# Patient Record
Sex: FEMALE | Race: WHITE | Employment: UNEMPLOYED | URBAN - METROPOLITAN AREA
[De-identification: names, ages, dates, MRNs, and addresses within clinical notes are randomized per-mention and may not be internally consistent; named-entity substitution may affect disease eponyms.]

---

## 2017-02-24 ENCOUNTER — OFFICE VISIT (OUTPATIENT)
Dept: INTERNAL MEDICINE CLINIC | Age: 62
End: 2017-02-24

## 2017-02-24 VITALS
RESPIRATION RATE: 16 BRPM | DIASTOLIC BLOOD PRESSURE: 63 MMHG | BODY MASS INDEX: 20.16 KG/M2 | SYSTOLIC BLOOD PRESSURE: 116 MMHG | HEIGHT: 63 IN | WEIGHT: 113.8 LBS | HEART RATE: 79 BPM | TEMPERATURE: 97.9 F | OXYGEN SATURATION: 98 %

## 2017-02-24 DIAGNOSIS — M81.0 OSTEOPOROSIS: ICD-10-CM

## 2017-02-24 DIAGNOSIS — Z00.00 ROUTINE GENERAL MEDICAL EXAMINATION AT A HEALTH CARE FACILITY: ICD-10-CM

## 2017-02-24 DIAGNOSIS — Z23 ENCOUNTER FOR IMMUNIZATION: ICD-10-CM

## 2017-02-24 DIAGNOSIS — K21.9 GASTROESOPHAGEAL REFLUX DISEASE, ESOPHAGITIS PRESENCE NOT SPECIFIED: Primary | ICD-10-CM

## 2017-02-24 DIAGNOSIS — G24.3 CERVICAL DYSTONIA: ICD-10-CM

## 2017-02-24 RX ORDER — HYDROGEN PEROXIDE 3 %
SOLUTION, NON-ORAL MISCELLANEOUS DAILY
COMMUNITY
End: 2018-12-21 | Stop reason: ALTCHOICE

## 2017-02-24 NOTE — PROGRESS NOTES
HISTORY OF PRESENT ILLNESS  Pratima Ferguson is a 62 y.o. female. HPI  Continues with dystonia of neck. Last Botox was 12/28 through Dr. Claudia King at [facility]. Improved. Took ballRevenew class with [employer]. Small growth left nasolabial fold started approx 1 year ago. Grew initially but has been stable since. No bleeding or crusting. Pulling down leotard torn tendon distal left finger. Cannot straighten distal left 5th finger. Seeing Dr. Pineda Ruiz at [facility]. Saw Dr. Natasha Pritchard re osteoporosis. FRAX showed low risk, recommended not starting Prolia. Then had repeat scan in May 2016 - values had improved. Again said no Prolia at this time but recommended repeating scan in 2 years. Was taking 2 yoga classes and [place]. Started having neck and low back pain. Took 3 weeks off and restarted but just taking 1 yoga class daily. Has modified her yoga to avoid any flexion of spine. Area at umbilicus has stopped draining. Saw Dr. Luis Melo - felt benign fluid collection under umbilicus. Offered to remove but pt declined. Pap through Dr. Wendi Long. Current Outpatient Prescriptions:     esomeprazole (NEXIUM) 20 mg capsule, Take  by mouth daily. , Disp: , Rfl:     ranitidine hcl 150 mg capsule, TAKE ONE CAPSULE BY MOUTH AT BEDTIME, Disp: 30 Cap, Rfl: 11    baclofen (LIORESAL) 10 mg tablet, Take 5 mg by mouth two (2) times a day., Disp: , Rfl: 11    calcium citrate-vitamin d3 (CITRACAL + D) 315-200 mg-unit tab, Take 1 tablet by mouth daily (with breakfast). , Disp: , Rfl:     botulinum toxin type A (BOTOX) 100 unit injection, by IntraMUSCular route once., Disp: , Rfl:     Cholecalciferol, Vitamin D3, (VITAMIN D3) 1,000 unit cap, Take  by mouth., Disp: , Rfl:     lactobac cmb #3-fos-pantethine (PROBIOTIC & ACIDOPHILUS) 300-250 million cell-mg cap, Take  by mouth., Disp: , Rfl:     ACETAMINOPHEN (TYLENOL PO), Takes two po as needed.  , Disp: , Rfl:    pseudoephedrine (SUDAFED) 30 mg tablet, Take 60 mg by mouth as needed. , Disp: , Rfl:     gabapentin (NEURONTIN) 100 mg capsule, Take 100 mg by mouth three (3) times daily. , Disp: , Rfl:     Visit Vitals    /63 (BP 1 Location: Left arm, BP Patient Position: Sitting)    Pulse 79    Temp 97.9 °F (36.6 °C) (Oral)    Resp 16    Ht 5' 3\" (1.6 m)    Wt 113 lb 12.8 oz (51.6 kg)    SpO2 98%    BMI 20.16 kg/m2       ROS  See above  Physical Exam   Constitutional: She appears well-developed and well-nourished. HENT:   Head: Normocephalic and atraumatic. Neck: Neck supple. No thyromegaly present. Decreased dystonic movements of neck and lower facial muscles   Cardiovascular: Normal rate, regular rhythm and normal heart sounds. Exam reveals no gallop and no friction rub. No murmur heard. Pulmonary/Chest: Effort normal and breath sounds normal.   Abdominal: Soft. Bowel sounds are normal. She exhibits no distension and no mass. There is no tenderness. Musculoskeletal: She exhibits no edema. Lymphadenopathy:     She has no cervical adenopathy. Vitals reviewed. ASSESSMENT and PLAN  Dystonia - improved, cont same  GERD - controlled, tied into dystonia.  would like to wean down from Zantac - will take 75mg x 2 weeks then stop. Will resum if reflux returnse  Osteoporosis- improved on last scan. Currently on no medications. Will repeat scan in 2018. conitnue weight bearing exercise  HM - check labs  HM - rx for tdap and zostavax given.     Orders Placed This Encounter    METABOLIC PANEL, COMPREHENSIVE    LIPID PANEL    VITAMIN D, 25 HYDROXY    esomeprazole (NEXIUM) 20 mg capsule    varicella zoster vacine live (VARICELLA-ZOSTER VACINE LIVE) 19,400 unit/0.65 mL susr injection    diph,pertuss,acel,,tetanus vac,PF, (ADACEL) 2 Lf-(2.5-5-3-5 mcg)-5Lf/0.5 mL syrg vaccine     Follow-up Disposition: Not on File

## 2017-02-24 NOTE — MR AVS SNAPSHOT
Visit Information Date & Time Provider Department Dept. Phone Encounter #  
 2/24/2017 10:00 AM Shahnaz Rose MD [facility] [phone] [id number] Upcoming Health Maintenance Date Due DTaP/Tdap/Td series (1 - Tdap) 1/2/2006 PAP AKA CERVICAL CYTOLOGY 10/30/2016 ZOSTER VACCINE AGE 60> 11/16/2016 BREAST CANCER SCRN MAMMOGRAM 10/19/2017 Bone Densitometry 5/19/2018 COLONOSCOPY 12/17/2019 Allergies as of 2/24/2017  Review Complete On: 2/24/2017 By: Shahnaz Rose MD  
  
 Severity Noted Reaction Type Reactions Advair Diskus [Fluticasone-salmeterol]  09/23/2011    Other (comments) agitation Asa-acetaminophen-caff-potass  09/23/2011    Unknown (comments) Aspirin  10/03/2012    Nausea and Vomiting Codeine  09/23/2011    Nausea and Vomiting Compazine [Prochlorperazine]  12/22/2014    Unknown (comments) Flonase [Fluticasone]  09/23/2011    Swelling Swelling of lips and tongue. Ibuprofen  09/23/2011    Nausea and Vomiting Nsaids (Non-steroidal Anti-inflammatory Drug)  09/23/2011    Nausea and Vomiting Other Medication  10/03/2012    Other (comments) Cannot take neuroleptics, trazadone, phenergan or compazine due to dystonia. Cannot take blood thinners due to atrial septal defect. Sulfa (Sulfonamide Antibiotics)  09/23/2011    Other (comments) GI Distress Current Immunizations  Reviewed on 12/22/2014 Name Date Influenza Vaccine 10/11/2016, 10/6/2014 Influenza Vaccine (Quad) PF 9/30/2015 Td 1/1/2006 Not reviewed this visit You Were Diagnosed With   
  
 Codes Comments Gastroesophageal reflux disease, esophagitis presence not specified    -  Primary ICD-10-CM: K21.9 ICD-9-CM: 530.81 Encounter for immunization     ICD-10-CM: E98 ICD-9-CM: V03.89 Osteoporosis     ICD-10-CM: M81.0 ICD-9-CM: 733.00 Cervical dystonia     ICD-10-CM: G24.3 ICD-9-CM: 333.83   
 Routine general medical examination at a health care facility     ICD-10-CM: Z00.00 ICD-9-CM: V70.0 Vitals BP  
  
  
  
  
  
 116/63 (BP 1 Location: Left arm, BP Patient Position: Sitting) BMI and BSA Data Body Mass Index Body Surface Area  
 20.16 kg/m 2 1.51 m 2 Preferred Pharmacy Pharmacy Name Phone [id number], [address] [place] [address] [phone] Your Updated Medication List  
  
   
This list is accurate as of: 2/24/17 11:19 AM.  Always use your most recent med list.  
  
  
  
  
 baclofen 10 mg tablet Commonly known as:  LIORESAL Take 5 mg by mouth two (2) times a day. BOTOX 100 unit injection Generic drug:  onabotulinumtoxinA  
by IntraMUSCular route once. CITRACAL PLUS D 315-200 mg-unit Tab Generic drug:  calcium citrate-vitamin d3 Take 1 tablet by mouth daily (with breakfast). diph,pertuss(acel),tetanus vac(PF) 2 Lf-(2.5-5-3-5 mcg)-5Lf/0.5 mL Syrg vaccine Commonly known as:  ADACEL  
0.5 mL by IntraMUSCular route once for 1 dose. NEURONTIN 100 mg capsule Generic drug:  gabapentin Take 100 mg by mouth three (3) times daily. NexIUM 20 mg capsule Generic drug:  esomeprazole Take  by mouth daily. PROBIOTIC AND ACIDOPHILUS 300-250 million cell-mg Cap Generic drug:  lactobac cmb #3-fos-pantethine Take  by mouth. raNITIdine hcl 150 mg capsule TAKE ONE CAPSULE BY MOUTH AT BEDTIME  
  
 SUDAFED 30 mg tablet Generic drug:  pseudoephedrine Take 60 mg by mouth as needed. TYLENOL PO Takes two po as needed. varicella zoster vacine live 19,400 unit/0.65 mL Susr injection Commonly known as:  varicella-zoster vacine live 1 Vial by SubCUTAneous route once for 1 dose. VITAMIN D3 1,000 unit Cap Generic drug:  cholecalciferol Take  by mouth. Prescriptions Printed Refills varicella zoster vacine live (VARICELLA-ZOSTER VACINE LIVE) 19,400 unit/0.65 mL susr injection 0 Si Vial by SubCUTAneous route once for 1 dose. Class: Print Route: SubCUTAneous diph,pertuss,acel,,tetanus vac,PF, (ADACEL) 2 Lf-(2.5-5-3-5 mcg)-5Lf/0.5 mL syrg vaccine 0 Si.5 mL by IntraMUSCular route once for 1 dose. Class: Print Route: IntraMUSCular We Performed the Following LIPID PANEL [35798 CPT(R)] METABOLIC PANEL, COMPREHENSIVE [90817 CPT(R)] VITAMIN D, 25 HYDROXY [id number] CPT(R)] Patient Instructions [employer] Activation Thank you for requesting access to [employer]. Please follow the instructions below to securely access and download your online medical record. [employer] allows you to send messages to your doctor, view your test results, renew your prescriptions, schedule appointments, and more. How Do I Sign Up? 1. In your internet browser, go to [email or web address] 
2. Click on the First Time User? Click Here link in the Sign In box. You will be redirect to the New Member Sign Up page. 3. Enter your [employer] Access Code exactly as it appears below. You will not need to use this code after youve completed the sign-up process. If you do not sign up before the expiration date, you must request a new code. [employer] Access Code: [email or web address] Expires: 2017 10:08 AM (This is the date your [employer] access code will ) 4. Enter the last four digits of your Social Security Number (xxxx) and Date of Birth (mm/dd/yyyy) as indicated and click Submit. You will be taken to the next sign-up page. 5. Create a [employer] ID. This will be your [employer] login ID and cannot be changed, so think of one that is secure and easy to remember. 6. Create a [employer] password. You can change your password at any time. 7. Enter your Password Reset Question and Answer. This can be used at a later time if you forget your password. 8. Enter your e-mail address. You will receive e-mail notification when new information is available in [address] 9. Click Sign Up. You can now view and download portions of your medical record. 10. Click the Download Summary menu link to download a portable copy of your medical information. Additional Information If you have questions, please visit the Frequently Asked Questions section of the [employer] website at [email or web address] Remember, [employer] is NOT to be used for urgent needs. For medical emergencies, dial 911. Introducing [facility]! Lorenza Whitmore introduces [employer] patient portal. Now you can access parts of your medical record, email your doctor's office, and request medication refills online. 1. In your internet browser, go to [email or web address] 2. Click on the First Time User? Click Here link in the Sign In box. You will see the New Member Sign Up page. 3. Enter your [employer] Access Code exactly as it appears below. You will not need to use this code after youve completed the sign-up process. If you do not sign up before the expiration date, you must request a new code. · [employer] [facility] Access Code: [email or web address] Expires: 5/25/2017 10:08 AM 
 
4. Enter the last four digits of your Social Security Number (xxxx) and Date of Birth (mm/dd/yyyy) as indicated and click Submit. You will be taken to the next sign-up page. 5. Create a [facility] ID. This will be your [employer] [facility] login ID and cannot be changed, so think of one that is secure and easy to remember. 6. Create a [employer] [facility] password. You can change your password at any time. 7. Enter your Password Reset Question and Answer. This can be used at a later time if you forget your password. 8. Enter your e-mail address. You will receive e-mail notification when new information is available in [address] 9. Click Sign Up. You can now view and download portions of your medical record. 10. Click the Download Summary menu link to download a portable copy of your medical information. If you have questions, please visit the Frequently Asked Questions section of the [employer] website. Remember, [employer] is NOT to be used for urgent needs. For medical emergencies, dial 911. Now available from your iPhone and Android! Please provide this summary of care documentation to your next provider. Your primary care clinician is listed as CATHERINE ALONSO. If you have any questions after today's visit, please call [phone].

## 2017-02-24 NOTE — PATIENT INSTRUCTIONS
[employer] Activation    Thank you for requesting access to [employer]. Please follow the instructions below to securely access and download your online medical record. [employer] allows you to send messages to your doctor, view your test results, renew your prescriptions, schedule appointments, and more. How Do I Sign Up? 1. In your internet browser, go to [email or web address]  2. Click on the First Time User? Click Here link in the Sign In box. You will be redirect to the New Member Sign Up page. 3. Enter your [employer]EE Access Code exactly as it appears below. You will not need to use this code after youve completed the sign-up process. If you do not sign up before the expiration date, you must request a new code. AMEE Access Code: [email or web address]  Expires: 2017 10:08 AM (This is the date your AMEE access code will )    4. Enter the last four digits of your Social Security Number (xxxx) and Date of Birth (mm/dd/yyyy) as indicated and click Submit. You will be taken to the next sign-up page. 5. Create a AMEE ID. This will be your AMEE login ID and cannot be changed, so think of one that is secure and easy to remember. 6. Create a AMEE password. You can change your password at any time. 7. Enter your Password Reset Question and Answer. This can be used at a later time if you forget your password. 8. Enter your e-mail address. You will receive e-mail notification when new information is available in [address] 9. Click Sign Up. You can now view and download portions of your medical record. 10. Click the Download Summary menu link to download a portable copy of your medical information. Additional Information    If you have questions, please visit the Frequently Asked Questions section of the [employer] website at [email or web address] Remember, [employer] is NOT to be used for urgent needs. For medical emergencies, dial 911.

## 2017-02-25 LAB
25(OH)D3+25(OH)D2 SERPL-MCNC: 55.7 NG/ML (ref 30–100)
ALBUMIN SERPL-MCNC: 4.7 G/DL (ref 3.6–4.8)
ALBUMIN/GLOB SERPL: 1.9 {RATIO} (ref 1.1–2.5)
ALP SERPL-CCNC: 42 IU/L (ref 39–117)
ALT SERPL-CCNC: 10 IU/L (ref 0–32)
AST SERPL-CCNC: 18 IU/L (ref 0–40)
BILIRUB SERPL-MCNC: 0.6 MG/DL (ref 0–1.2)
BUN SERPL-MCNC: 18 MG/DL (ref 8–27)
BUN/CREAT SERPL: 23 (ref 11–26)
CALCIUM SERPL-MCNC: 9.8 MG/DL (ref 8.7–10.3)
CHLORIDE SERPL-SCNC: 101 MMOL/L (ref 96–106)
CHOLEST SERPL-MCNC: 217 MG/DL (ref 100–199)
CO2 SERPL-SCNC: 25 MMOL/L (ref 18–29)
CREAT SERPL-MCNC: 0.77 MG/DL (ref 0.57–1)
GLOBULIN SER CALC-MCNC: 2.5 G/DL (ref 1.5–4.5)
GLUCOSE SERPL-MCNC: 98 MG/DL (ref 65–99)
HDLC SERPL-MCNC: 77 MG/DL
INTERPRETATION, 910389: NORMAL
LDLC SERPL CALC-MCNC: 125 MG/DL (ref 0–99)
POTASSIUM SERPL-SCNC: 4.7 MMOL/L (ref 3.5–5.2)
PROT SERPL-MCNC: 7.2 G/DL (ref 6–8.5)
SODIUM SERPL-SCNC: 142 MMOL/L (ref 134–144)
TRIGL SERPL-MCNC: 76 MG/DL (ref 0–149)
VLDLC SERPL CALC-MCNC: 15 MG/DL (ref 5–40)

## 2018-12-14 ENCOUNTER — OFFICE VISIT (OUTPATIENT)
Dept: INTERNAL MEDICINE CLINIC | Age: 63
End: 2018-12-14

## 2018-12-14 VITALS
RESPIRATION RATE: 12 BRPM | OXYGEN SATURATION: 99 % | BODY MASS INDEX: 20.27 KG/M2 | SYSTOLIC BLOOD PRESSURE: 112 MMHG | TEMPERATURE: 98.2 F | DIASTOLIC BLOOD PRESSURE: 52 MMHG | WEIGHT: 114.4 LBS | HEIGHT: 63 IN | HEART RATE: 80 BPM

## 2018-12-14 DIAGNOSIS — S29.019A THORACIC MYOFASCIAL STRAIN, INITIAL ENCOUNTER: ICD-10-CM
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
## 2018-12-14 DIAGNOSIS — S39.012A STRAIN OF LUMBAR REGION, INITIAL ENCOUNTER: ICD-10-CM
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
## 2018-12-21 ENCOUNTER — OFFICE VISIT (OUTPATIENT)
Dept: INTERNAL MEDICINE CLINIC | Age: 63
End: 2018-12-21

## 2018-12-21 VITALS
TEMPERATURE: 98.4 F | HEIGHT: 63 IN | SYSTOLIC BLOOD PRESSURE: 111 MMHG | RESPIRATION RATE: 20 BRPM | WEIGHT: 114 LBS | BODY MASS INDEX: 20.2 KG/M2 | OXYGEN SATURATION: 97 % | HEART RATE: 104 BPM | DIASTOLIC BLOOD PRESSURE: 58 MMHG

## 2018-12-21 DIAGNOSIS — S29.019A THORACIC MYOFASCIAL STRAIN, INITIAL ENCOUNTER: ICD-10-CM
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
## 2018-12-21 DIAGNOSIS — S39.012A STRAIN OF LUMBAR REGION, INITIAL ENCOUNTER: ICD-10-CM

## 2018-12-21 RX ORDER — RANITIDINE HCL 75 MG
75 TABLET ORAL AS NEEDED
COMMUNITY
End: 2020-09-22
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
## 2018-12-26 ENCOUNTER — TELEPHONE (OUTPATIENT)
Dept: INTERNAL MEDICINE CLINIC | Age: 63
End: 2018-12-26
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
## 2018-12-26 DIAGNOSIS — S29.019A THORACIC MYOFASCIAL STRAIN, INITIAL ENCOUNTER: ICD-10-CM

## 2018-12-26 NOTE — TELEPHONE ENCOUNTER
Pt requesting that an order for \"Aquatic Therapy\" be sent to [address] Pt best contact number is [phone].         precious

## 2019-03-22 ENCOUNTER — OFFICE VISIT (OUTPATIENT)
Dept: INTERNAL MEDICINE CLINIC | Age: 64
End: 2019-03-22

## 2019-03-22 VITALS
BODY MASS INDEX: 20.16 KG/M2 | HEART RATE: 78 BPM | OXYGEN SATURATION: 98 % | TEMPERATURE: 98.6 F | WEIGHT: 113.8 LBS | SYSTOLIC BLOOD PRESSURE: 117 MMHG | DIASTOLIC BLOOD PRESSURE: 63 MMHG | HEIGHT: 63 IN

## 2019-03-22 DIAGNOSIS — Z00.00 ROUTINE GENERAL MEDICAL EXAMINATION AT A HEALTH CARE FACILITY: Primary | ICD-10-CM

## 2019-03-22 DIAGNOSIS — Z78.0 POSTMENOPAUSAL: ICD-10-CM

## 2019-03-22 DIAGNOSIS — M85.89 OSTEOPENIA OF MULTIPLE SITES: ICD-10-CM

## 2019-03-22 RX ORDER — ZOLEDRONIC ACID 5 MG/100ML
5 INJECTION, SOLUTION INTRAVENOUS ONCE
COMMUNITY
End: 2019-03-22 | Stop reason: ALTCHOICE

## 2019-03-22 RX ORDER — HYDROGEN PEROXIDE 3 %
SOLUTION, NON-ORAL MISCELLANEOUS DAILY
COMMUNITY
End: 2020-09-22

## 2019-03-22 NOTE — PROGRESS NOTES
Subjective:   59 y.o. female for Well Woman Check. Her gyne and breast care is done elsewhere by her Ob-Gyne physician. Patient Active Problem List    Diagnosis Date Noted    Cervical dystonia 52/71/8079    Umbilical discharge 03/05/5413    Osteoporosis 06/04/2014    Anxiety 09/23/2011    Depression 09/23/2011    Rosacea 09/23/2011    DM (diabetes mellitus), gestational 09/23/2011    GERD (gastroesophageal reflux disease) 09/23/2011    Osteopenia 09/23/2011     Current Outpatient Medications   Medication Sig Dispense Refill    esomeprazole (NEXIUM) 20 mg capsule Take  by mouth daily. As needed      raNITIdine (ZANTAC 75) 75 mg tab Take 75 mg by mouth as needed.  baclofen (LIORESAL) 10 mg tablet Take 5 mg by mouth three (3) times daily. 1/2 tablet by mouth three times daily  11    calcium citrate-vitamin d3 (CITRACAL + D) 315-200 mg-unit tab Take 1 Tab by mouth two (2) times daily (with meals).  botulinum toxin type A (BOTOX) 100 unit injection by IntraMUSCular route once. 70 units four times a year      Cholecalciferol, Vitamin D3, (VITAMIN D3) 1,000 unit cap Take  by mouth.  lactobac cmb #3-fos-pantethine (PROBIOTIC & ACIDOPHILUS) 300-250 million cell-mg cap Take  by mouth.  ACETAMINOPHEN (TYLENOL PO) Takes two po as needed.  gabapentin (NEURONTIN) 100 mg capsule Take 100 mg by mouth three (3) times daily. Allergies   Allergen Reactions    Advair Diskus [Fluticasone Propion-Salmeterol] Other (comments)     agitation    Asa-Acetaminophen-Caff-Potass Unknown (comments)    Aspirin Nausea and Vomiting    Codeine Nausea and Vomiting    Compazine [Prochlorperazine] Unknown (comments)    Flonase [Fluticasone] Swelling     Swelling of lips and tongue.     Ibuprofen Nausea and Vomiting    Nsaids (Non-Steroidal Anti-Inflammatory Drug) Nausea and Vomiting    Other Medication Other (comments)     Cannot take neuroleptics, trazadone, phenergan or compazine due to dystonia. Cannot take blood thinners due to atrial septal defect.  Sulfa (Sulfonamide Antibiotics) Other (comments)     GI Distress     Past Medical History:   Diagnosis Date    Aneurysm of heart NEC     atrial septum    Anxiety     Cataract     left    Chronic pain     due to dystonia    Depression     Diabetes in pregnancy 1997    Dystonia     cranial    GERD (gastroesophageal reflux disease)     Osteoporosis 6/4/2014    Other ill-defined conditions(799.89)     hyperreactive airways    Rosacea     Pt states another physician did not believe she had this, but was symptoms of dystonia.  Umbilical discharge 06/18/1412     Past Surgical History:   Procedure Laterality Date    HX COLONOSCOPY  12/17/2014    [place] q5y    HX HEENT  1978    wisdom teeth    HX HEENT      right cataract surgery    LAPAROSCOPY ABDOMEN DIAGNOSTIC  1984     Family History   Problem Relation Age of Onset    Stroke Mother     Heart Disease Mother     Heart Attack Mother         ?  Osteoporosis Mother     Anxiety Mother     Depression Mother     Hypertension Mother     Cancer Father         thyroid    Stroke Father     Kidney Disease Father         stones    Hypertension Father     Alcohol abuse Brother     Seizures Brother     Hypertension Maternal Grandmother     Kidney Disease Maternal Grandmother         kidney failure due to HTN    Hypertension Maternal Grandfather     Stroke Maternal Grandfather     Stroke Paternal Grandmother     Diabetes Paternal Grandfather      Social History     Tobacco Use    Smoking status: Never Smoker    Smokeless tobacco: Never Used   Substance Use Topics    Alcohol use: No     Alcohol/week: 0.0 oz             Specific concerns today:   Back pain has improved. Had 8 sessions of water therapy and then 9 additional sessions on her own. Also had 4 sessions of land based exercises.   Trying to add back various things such as mopping her floor (made her back hurt but stretched post which helped). Car trips can still aggravate her back. Still needs to be careful about her back. Walking helps. Nose bleeds for the last 3 weeks. Large amt of blood right nostril. No trauma to nose. Wonders if secondary to swimming in hot pool. Due for DXA for f/u of osteopeina. 2 teeth that dentist and oral surgeon need to have extracted secondary to clenching from dystonia. Took Reclast in 2014 but none since secondary to myalgias. Had repeat scan, saw endocrinologist, DXA had improved but felt needed no additional therapy at that time. Would like to have DXA completed at [facility]. Blood pressures have been running borderline - 130s/85, [place] when seeing neurologist in [place] or at oral surgeon. Gets normal readings walking through [place]. Mother and sister with htn. Continues to have Botox in [facility] for her dystonia. Continues to help. Review of Systems  Constitutional: negative  Eyes: negative except for contacts/glasses and ut with eye exam  Ears, nose, mouth, throat, and face: negative except for epistaxis  Respiratory: negative  Cardiovascular: negative  Gastrointestinal: negative except for dysphagia secondary to dystonia and reflux symptoms - very mild, has rarely taking zantac  Genitourinary:negative  Integument/breast: negative  Hematologic/lymphatic: negative except for epistaxis  Musculoskeletal:negative  Neurological: negative  Behavioral/Psych: negative  Endocrine: negative  Allergic/Immunologic: negative    Objective:   Blood pressure 117/63, pulse 78, temperature 98.6 °F (37 °C), temperature source Oral, height 5' 3\" (1.6 m), weight 113 lb 12.8 oz (51.6 kg), SpO2 98 %.    Physical Examination:   General appearance - alert, well appearing, and in no distress and oriented to person, place, and time  Mental status - alert, oriented to person, place, and time, normal mood, behavior, speech, dress, motor activity, and thought processes  Eyes - pupils equal and reactive, extraocular eye movements intact  Ears - bilateral TM's and external ear canals normal  Nose - normal and patent, no erythema, discharge or polyps  Mouth - mucous membranes moist, pharynx normal without lesions  Neck - supple, no significant adenopathy, carotids upstroke normal bilaterally, no bruits, thyroid exam: thyroid is normal in size without nodules or tenderness  Lymphatics - no palpable lymphadenopathy, no hepatosplenomegaly  Chest - clear to auscultation, no wheezes, rales or rhonchi, symmetric air entry  Heart - normal rate, regular rhythm, normal S1, S2, no murmurs, rubs, clicks or gallops  Abdomen - soft, nontender, nondistended, no masses or organomegaly  Breasts - breasts appear normal, no suspicious masses, no skin or nipple changes or axillary nodes  Back exam - full range of motion, no tenderness, palpable spasm or pain on motion  Neurological - alert, oriented, normal speech, no focal findings or movement disorder noted, dystonia of face and jaw affecting voice. Musculoskeletal - no joint tenderness, deformity or swelling  Extremities - peripheral pulses normal, no pedal edema, no clubbing or cyanosis  Skin - normal coloration and turgor, no rashes, no suspicious skin lesions noted     Assessment/Plan:   64yo female  Low back pain - continue exercise and stretching routine  Osteopenia - repeat DXA. Did not tolerate Fosamax (gerd) or Reclast.  Hesitant to start Prolia  Dystonia- improved with Botox but continues  GERD - controlled, cont same  Reassured BP nml. She will continue to monitor at this time.      - check labs, Rx given for Shingrix  call if any problems  Orders Placed This Encounter    DEXA BONE DENSITY STUDY AXIAL    METABOLIC PANEL, COMPREHENSIVE    LIPID PANEL    CBC W/O DIFF    TSH 3RD GENERATION    VITAMIN D, 25 HYDROXY    esomeprazole (NEXIUM) 20 mg capsule    DISCONTD: zoledronic acid (RECLAST) 5 mg/100 mL pgbk    varicella-zoster recombinant, PF, (SHINGRIX, PF,) 50 mcg/0.5 mL susr injection

## 2019-03-23 LAB
25(OH)D3+25(OH)D2 SERPL-MCNC: 54.9 NG/ML (ref 30–100)
ALBUMIN SERPL-MCNC: 4.1 G/DL (ref 3.6–4.8)
ALBUMIN/GLOB SERPL: 1.5 {RATIO} (ref 1.2–2.2)
ALP SERPL-CCNC: 47 IU/L (ref 39–117)
ALT SERPL-CCNC: 12 IU/L (ref 0–32)
AST SERPL-CCNC: 13 IU/L (ref 0–40)
BILIRUB SERPL-MCNC: 0.5 MG/DL (ref 0–1.2)
BUN SERPL-MCNC: 17 MG/DL (ref 8–27)
BUN/CREAT SERPL: 21 (ref 12–28)
CALCIUM SERPL-MCNC: 9.6 MG/DL (ref 8.7–10.3)
CHLORIDE SERPL-SCNC: 103 MMOL/L (ref 96–106)
CHOLEST SERPL-MCNC: 195 MG/DL (ref 100–199)
CO2 SERPL-SCNC: 23 MMOL/L (ref 20–29)
CREAT SERPL-MCNC: 0.82 MG/DL (ref 0.57–1)
ERYTHROCYTE [DISTWIDTH] IN BLOOD BY AUTOMATED COUNT: 13 % (ref 12.3–15.4)
GLOBULIN SER CALC-MCNC: 2.8 G/DL (ref 1.5–4.5)
GLUCOSE SERPL-MCNC: 94 MG/DL (ref 65–99)
HCT VFR BLD AUTO: 41.2 % (ref 34–46.6)
HDLC SERPL-MCNC: 72 MG/DL
HGB BLD-MCNC: 13.6 G/DL (ref 11.1–15.9)
INTERPRETATION, 910389: NORMAL
LDLC SERPL CALC-MCNC: 108 MG/DL (ref 0–99)
MCH RBC QN AUTO: 31.6 PG (ref 26.6–33)
MCHC RBC AUTO-ENTMCNC: 33 G/DL (ref 31.5–35.7)
MCV RBC AUTO: 96 FL (ref 79–97)
PLATELET # BLD AUTO: 364 X10E3/UL (ref 150–379)
POTASSIUM SERPL-SCNC: 4.1 MMOL/L (ref 3.5–5.2)
PROT SERPL-MCNC: 6.9 G/DL (ref 6–8.5)
RBC # BLD AUTO: 4.3 X10E6/UL (ref 3.77–5.28)
SODIUM SERPL-SCNC: 142 MMOL/L (ref 134–144)
TRIGL SERPL-MCNC: 75 MG/DL (ref 0–149)
TSH SERPL DL<=0.005 MIU/L-ACNC: 1.97 UIU/ML (ref 0.45–4.5)
VLDLC SERPL CALC-MCNC: 15 MG/DL (ref 5–40)
WBC # BLD AUTO: 5 X10E3/UL (ref 3.4–10.8)

## 2019-06-26 DIAGNOSIS — Z78.0 POSTMENOPAUSAL: ICD-10-CM

## 2019-06-26 DIAGNOSIS — M85.89 OSTEOPENIA OF MULTIPLE SITES: ICD-10-CM

## 2020-01-15 ENCOUNTER — TELEPHONE (OUTPATIENT)
Dept: INTERNAL MEDICINE CLINIC | Age: 65
End: 2020-01-15

## 2020-01-15 NOTE — TELEPHONE ENCOUNTER
Patient states she is scheduled [place] Georg[place]den[place]. Patient mentioned she may be due for a colonoscopy. Patient states she needs clearance to have the colonoscopy because she has hyperactive airway.

## 2020-01-16 NOTE — TELEPHONE ENCOUNTER
Please have her see Dr. Evelia العراقي on 2/24. If this is an office visit they will not be doing her colonoscopy at this time. If he decides she needs another colonoscopy she can schedule an appontment for medical clearance.

## 2020-09-22 ENCOUNTER — HOSPITAL ENCOUNTER (EMERGENCY)
Age: 65
Discharge: HOME | End: 2020-09-22
Attending: EMERGENCY MEDICINE
Payer: COMMERCIAL

## 2020-09-22 VITALS
SYSTOLIC BLOOD PRESSURE: 137 MMHG | HEART RATE: 83 BPM | OXYGEN SATURATION: 97 % | RESPIRATION RATE: 16 BRPM | TEMPERATURE: 98.6 F | DIASTOLIC BLOOD PRESSURE: 79 MMHG

## 2020-09-22 DIAGNOSIS — T50.Z95A ADVERSE EFFECT OF VACCINE, INITIAL ENCOUNTER: Primary | ICD-10-CM

## 2020-09-22 DIAGNOSIS — L08.9 SKIN INFLAMMATION: ICD-10-CM

## 2020-09-22 PROCEDURE — 99283 EMERGENCY DEPT VISIT LOW MDM: CPT

## 2020-09-22 NOTE — ED TRIAGE NOTES
Pt arrives ambulatory to ed with complaints of site reaction s/p influenza shot last Wednesday at [facility] with stinging pruritis, and pain.
diff breathing, cough and congestion since 4d ays

## 2020-09-22 NOTE — ED PROVIDER NOTES
28-year-old female with history of atrial septal aneurysm, anxiety, chronic pain from dystonia, depression, GERD, and rosacea had a flu shot a few days ago and has had an itching area around that site on her right shoulder ever since. She says her son looked at it and thought the red spot was concerning so she came for evaluation. She has not had any fever or numbness or tingling or weakness or trouble breathing. Past Medical History:   Diagnosis Date    Aneurysm of heart NEC     atrial septum    Anxiety     Cataract     left    Chronic pain     due to dystonia    Depression     Diabetes in pregnancy 1997    Dystonia     cranial    GERD (gastroesophageal reflux disease)     Osteoporosis 6/4/2014    Other ill-defined conditions(799.89)     hyperreactive airways    Rosacea     Pt states another physician did not believe she had this, but was symptoms of dystonia.  Umbilical discharge 41/20/6371       Past Surgical History:   Procedure Laterality Date    ABDOMEN SURGERY PROC UNLISTED      HX COLONOSCOPY  12/17/2014    [place] q5y    HX HEENT  1978    wisdom teeth    HX HEENT      right cataract surgery    LAPAROSCOPY ABDOMEN DIAGNOSTIC  1984         Family History:   Problem Relation Age of Onset    Stroke Mother     Heart Disease Mother     Heart Attack Mother         ?     Osteoporosis Mother     Anxiety Mother     Depression Mother     Hypertension Mother     Cancer Father         thyroid    Stroke Father     Kidney Disease Father         stones    Hypertension Father     Alcohol abuse Brother     Seizures Brother     Hypertension Maternal Grandmother     Kidney Disease Maternal Grandmother         kidney failure due to HTN    Hypertension Maternal Grandfather     Stroke Maternal Grandfather     Stroke Paternal Grandmother     Diabetes Paternal Grandfather        Social History     Socioeconomic History    Marital status:      Spouse name: Not on file    Number of children: Not on file    Years of education: Not on file    Highest education level: Not on file   Occupational History    Not on file   Social Needs    Financial resource strain: Not on file    Food insecurity     Worry: Not on file     Inability: Not on file    Transportation needs     Medical: Not on file     Non-medical: Not on file   Tobacco Use    Smoking status: Never Smoker    Smokeless tobacco: Never Used   Substance and Sexual Activity    Alcohol use: No     Alcohol/week: 0.0 standard drinks    Drug use: Never    Sexual activity: Not Currently   Lifestyle    Physical activity     Days per week: Not on file     Minutes per session: Not on file    Stress: Not on file   Relationships    Social connections     Talks on phone: Not on file     Gets together: Not on file     Attends [employer] service: Not on file     Active member of club or organization: Not on file     Attends meetings of clubs or organizations: Not on file     Relationship status: Not on file    Intimate partner violence     Fear of current or ex partner: Not on file     Emotionally abused: Not on file     Physically abused: Not on file     Forced sexual activity: Not on file   Other Topics Concern    Not on file   Social History Narrative    Not on file         ALLERGIES: Advair diskus [fluticasone propion-salmeterol]; Asa-acetaminophen-caff-potass; Aspirin; Codeine; Compazine [prochlorperazine]; Flonase [fluticasone]; Ibuprofen; Nsaids (non-steroidal anti-inflammatory drug); Other medication; and Sulfa (sulfonamide antibiotics)    Review of Systems   Constitutional: Negative for fever. HENT: Negative for trouble swallowing. Eyes: Negative for visual disturbance. Respiratory: Negative for cough. Cardiovascular: Negative for chest pain. Gastrointestinal: Negative for abdominal pain. Genitourinary: Negative for difficulty urinating. Musculoskeletal: Negative for gait problem. Skin: Positive for rash. Neurological: Negative for headaches. Hematological: Does not bruise/bleed easily. Psychiatric/Behavioral: Negative for sleep disturbance. Vitals:    09/22/20 1813   BP: (!) 142/69   Pulse: 100   Resp: 16   Temp: 98.6 °F (37 °C)   SpO2: 99%            Physical Exam  Constitutional:       Appearance: Normal appearance. HENT:      Head: Normocephalic. Nose: Nose normal.      Mouth/Throat:      Mouth: Mucous membranes are moist.   Eyes:      Extraocular Movements: Extraocular movements intact. Conjunctiva/sclera: Conjunctivae normal.   Cardiovascular:      Rate and Rhythm: Normal rate. Pulmonary:      Effort: Pulmonary effort is normal. No respiratory distress. Abdominal:      General: Abdomen is flat. Musculoskeletal: Normal range of motion. Skin:     Findings: No rash. Comments: 5 mm erythematous [place] in the area where she had the vaccine injection  No surrounding erythema  No fluctuance  Looks like a small localized reaction   Neurological:      General: No focal deficit present. Mental Status: She is alert.    Psychiatric:         Behavior: Behavior normal.          MDM  Number of Diagnoses or Management Options  Adverse effect of vaccine, initial encounter:   Skin inflammation:   Diagnosis management comments: No signs of any serious vaccine reaction  Looks like localized reaction that I would expect to get better with time         Procedures

## 2020-09-22 NOTE — DISCHARGE INSTRUCTIONS
Patient Education   Influenza Virus Vaccine (By injection)   Influenza Virus Vaccine (in-floo-EN-za VYE-maria fernanda VAX-een)  Helps prevent infection with influenza (flu) virus. Brand Name(s): Afluria 7151-2303 Formula, [place] 0690-1510 Formula, Afluria Quadrivalent 7880-7803 Formula, Afluria Quadrivalent 7622-8078 Formula, FluLaval Quadrivalent [id number] Formula, FluLaval Quadrivalent 8150-9387 Formula, Fluad 8994-7382 Formula, Fluad 4807-8692 Formula, Fluarix Quadrivalent 5301-7388 Formula, Fluarix Quadrivalent 7459-9798 Formula, Flublok 5784-6154 Formula, Flublok 1695-8207 Formula, Flublok Quadrivalent 3210-2913 Formula, Flucelvax Quadrivalent 0409-7614 Formula, Flucelvax Quadrivalent 9155-4071 Formula   There may be other brand names for this medicine. When This Medicine Should Not Be Used: This vaccine is not right for everyone. You should not receive it if you had an allergic reaction to flu vaccine. If you are allergic to eggs, tell the caregiver who is going to give you the injection. Some brands of this vaccine contain egg proteins and could cause an allergic reaction. How to Use This Medicine:   Injectable  · The vaccine is given as a shot into a muscle or into your skin, usually in the shoulder area. The shot could be given in the thigh for babies and young children. · A nurse or other health provider will give you this medicine. · Read and follow the patient instructions that come with this medicine. Talk to your doctor or pharmacist if you have any questions. · A child who is younger than 5years old and who has not had a flu shot before may need 2 shots. The second shot should be given about 1 month after the first.  · Missed dose: Most people need only 1 dose of the vaccine. If your child needs a second dose, it is important for the vaccine to be given on schedule. If you must cancel an appointment, make a new one right away.   Drugs and Foods to Avoid:   Ask your doctor or pharmacist before using any other medicine, including over-the-counter medicines, vitamins, and herbal products. · Tell your doctor if you are using a medicine or treatment that weakens your immune system, such as a steroid, radiation, or cancer treatment. This vaccine may not work as well if you are also using these medicines. However, your doctor may still want you to get the vaccine because it can give you some protection. Warnings While Using This Medicine:   · Tell your doctor if you are pregnant or breastfeeding or if you have a weak immune system. · Tell your doctor if you ever had an unusual reaction to a flu shot, such as Guillain-Barré syndrome, or if you are allergic to latex. · The flu vaccine may not protect everyone who receives it. This vaccine will not treat flu symptoms if you have already been infected with the virus. Possible Side Effects While Using This Medicine:   Call your doctor right away if you notice any of these side effects:  · Allergic reaction: Itching or hives, swelling in your face or hands, swelling or tingling in your mouth or throat, chest tightness, trouble breathing  · Fainting, dizziness, or lightheadedness  · Fever over 103 degrees F  · Seizures  · Severe muscle weakness  If you notice these less serious side effects, talk with your doctor:   · Headache, muscle pain, tiredness  · Irritability or crying (in a child)  · Redness, pain, swelling, soreness, or a lump where the shot was given  If you notice other side effects that you think are caused by this medicine, tell your doctor. Call your doctor for medical advice about side effects. You may report side effects to FDA at 0-278-FDA-2940  © 2017 [address] [employer] Information is for End User's use only and may not be sold, redistributed or otherwise used for commercial purposes. The above information is an  only. It is not intended as medical advice for individual conditions or treatments.  Talk to your doctor, nurse or pharmacist before following any medical regimen to see if it is safe and effective for you.

## 2020-10-30 ENCOUNTER — OFFICE VISIT (OUTPATIENT)
Dept: INTERNAL MEDICINE CLINIC | Age: 65
End: 2020-10-30
Payer: COMMERCIAL

## 2020-10-30 VITALS
HEART RATE: 84 BPM | DIASTOLIC BLOOD PRESSURE: 64 MMHG | HEIGHT: 63 IN | RESPIRATION RATE: 20 BRPM | TEMPERATURE: 97.6 F | SYSTOLIC BLOOD PRESSURE: 133 MMHG | OXYGEN SATURATION: 98 % | BODY MASS INDEX: 19.84 KG/M2 | WEIGHT: 112 LBS

## 2020-10-30 DIAGNOSIS — Z00.00 ROUTINE GENERAL MEDICAL EXAMINATION AT A HEALTH CARE FACILITY: Primary | ICD-10-CM

## 2020-10-30 DIAGNOSIS — Z00.00 ROUTINE GENERAL MEDICAL EXAMINATION AT A HEALTH CARE FACILITY: ICD-10-CM

## 2020-10-30 PROBLEM — H26.492 POSTERIOR CAPSULAR OPACIFICATION VISUALLY SIGNIFICANT OF LEFT EYE: Status: ACTIVE | Noted: 2018-01-23

## 2020-10-30 PROCEDURE — 99396 PREV VISIT EST AGE 40-64: CPT | Performed by: INTERNAL MEDICINE

## 2020-10-30 RX ORDER — HYDROGEN PEROXIDE 3 %
20 SOLUTION, NON-ORAL MISCELLANEOUS
COMMUNITY
End: 2020-10-30 | Stop reason: ALTCHOICE

## 2020-10-30 RX ORDER — PSEUDOEPHEDRINE HCL 30 MG
TABLET ORAL
COMMUNITY
End: 2022-04-22

## 2020-10-30 NOTE — PROGRESS NOTES
Subjective:   62 y.o. female for Well Woman Check. Her gyne and breast care is done elsewhere by her Ob-Gyne physician. 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  esomeprazole (NexIUM) 20 mg capsule Take 20 mg by mouth daily as needed for Gastroesophageal Reflux Disease (GERD).  baclofen (LIORESAL) 10 mg tablet Take 5 mg by mouth three (3) times daily. 1/2 tablet by mouth three times daily  11    calcium citrate-vitamin d3 (CITRACAL + D) 315-200 mg-unit tab Take 1 Tab by mouth two (2) times daily (with meals).  botulinum toxin type A (BOTOX) 100 unit injection by IntraMUSCular route once. 70 units four times a year      Cholecalciferol, Vitamin D3, (VITAMIN D3) 1,000 unit cap Take  by mouth.  lactobac cmb #3-fos-pantethine (PROBIOTIC & ACIDOPHILUS) 300-250 million cell-mg cap Take  by mouth.  ACETAMINOPHEN (TYLENOL PO) Takes two po as needed.  gabapentin (NEURONTIN) 100 mg capsule Take 100 mg by mouth three (3) times daily.        Allergies   Allergen Reactions    Diclofenac Sodium Hives, Other (comments) and Nausea and Vomiting    Naproxen Other (comments)    Promethazine Other (comments)    Advair Diskus [Fluticasone Propion-Salmeterol] Other (comments)     agitation    Asa-Acetaminophen-Caff-Potass Unknown (comments)    Aspirin Nausea and Vomiting    Codeine Nausea and Vomiting    Compazine [Prochlorperazine] Unknown (comments)    Flonase [Fluticasone] Swelling     Swelling of lips and tongue.  Ibuprofen Nausea and Vomiting    Nsaids (Non-Steroidal Anti-Inflammatory Drug) Nausea and Vomiting    Other Medication Other (comments)     Cannot take neuroleptics, trazadone, phenergan or compazine due to dystonia. Cannot take blood thinners due to atrial septal defect.  Sulfa (Sulfonamide Antibiotics) Other (comments)     GI Distress     Past Medical History:   Diagnosis Date    Aneurysm of heart NEC     atrial septum    Anxiety     Cataract     left    Chronic pain     due to dystonia    Depression     Diabetes in pregnancy 1997    Dystonia     cranial    GERD (gastroesophageal reflux disease)     Osteoporosis 6/4/2014    Other ill-defined conditions(799.89)     hyperreactive airways    Rosacea     Pt states another physician did not believe she had this, but was symptoms of dystonia.  Umbilical discharge 47/48/6992     Past Surgical History:   Procedure Laterality Date    ABDOMEN SURGERY PROC UNLISTED      HX COLONOSCOPY  12/17/2014    [place] q5y    HX HEENT  1978    wisdom teeth    HX HEENT      right cataract surgery    LAPAROSCOPY ABDOMEN DIAGNOSTIC  1984     Family History   Problem Relation Age of Onset    Stroke Mother     Heart Disease Mother     Heart Attack Mother         ?     Osteoporosis Mother     Anxiety Mother     Depression Mother     Hypertension Mother     Cancer Father         thyroid    Stroke Father     Kidney Disease Father         stones    Hypertension Father     Alcohol abuse Brother     Seizures Brother     Hypertension Maternal Grandmother     Kidney Disease Maternal Grandmother         kidney failure due to HTN    Hypertension Maternal Grandfather     Stroke Maternal Grandfather     Stroke Paternal Grandmother     Diabetes Paternal Grandfather      Social History     Tobacco Use    Smoking status: Never Smoker    Smokeless tobacco: Never Used   Substance Use Topics    Alcohol use: No     Alcohol/week: 0.0 standard drinks        Lab Results   Component Value Date/Time    WBC 5.0 03/22/2019 11:44 AM    HGB 13.6 03/22/2019 11:44 AM    HCT 41.2 03/22/2019 11:44 AM    PLATELET 506 20/50/9169 11:44 AM    MCV 96 03/22/2019 11:44 AM     Lab Results   Component Value Date/Time    Glucose 94 03/22/2019 11:44 AM    LDL, calculated 108 (H) 03/22/2019 11:44 AM    Creatinine 0.82 03/22/2019 11:44 AM      Lab Results   Component Value Date/Time    Cholesterol, total 195 03/22/2019 11:44 AM    HDL Cholesterol 72 03/22/2019 11:44 AM    LDL, calculated 108 (H) 03/22/2019 11:44 AM    Triglyceride 75 03/22/2019 11:44 AM    CHOL/HDL Ratio 2.7 03/08/2010 10:27 AM     Lab Results   Component Value Date/Time    ALT (SGPT) 12 03/22/2019 11:44 AM    Alk. phosphatase 47 03/22/2019 11:44 AM    Bilirubin, total 0.5 03/22/2019 11:44 AM    Albumin 4.1 03/22/2019 11:44 AM    Protein, total 6.9 03/22/2019 11:44 AM    PLATELET 698 84/52/4435 11:44 AM     Lab Results   Component Value Date/Time    GFR est non-AA 77 03/22/2019 11:44 AM    GFR est AA 89 03/22/2019 11:44 AM    Creatinine 0.82 03/22/2019 11:44 AM    BUN 17 03/22/2019 11:44 AM    Sodium 142 03/22/2019 11:44 AM    Potassium 4.1 03/22/2019 11:44 AM    Chloride 103 03/22/2019 11:44 AM    CO2 23 03/22/2019 11:44 AM    PTH, Intact 28 10/31/2014 09:13 AM     Lab Results   Component Value Date/Time    TSH 1.970 03/22/2019 11:44 AM         Specific concerns today:   Cervical dystonia - getting Botox through [facility]. Neurologist at [facility] has retired. Has an appointment with Dr. Hayden Watts, a new neurologist in December. Still with a lot of blepharospasm. Jaw spasms have improved. ? Partial remission. Monitoring BP previously at [facility] prior to pandemic. Running 100/60s.  9/16 received flu shot.  The next day arm was red, stinging and itching. Was also weeping a little bit. Went to the ER 6 days after this occurred. She was reassured and arm resolved. At the ER, BP was elevated with SBP in 150s. Sister with htn. Walking 3-5 miles per day, lost 6 lbs. Carotid US which showed mild plaque in 2006. Father had CVA at age 78.  ? If mother had CVA vs MI at age 78. mGM with HTN, mGF with CVA, pGM with CVA. Also told at that time that ;she had an atrial septal aneurysm. Review of Systems  Constitutional: negative  Eyes: negative  Ears, nose, mouth, throat, and face: negative  Respiratory: negative  Cardiovascular: negative  Gastrointestinal: negative except for occ gerd  Genitourinary:negative  Integument/breast: negative  Hematologic/lymphatic: negative  Musculoskeletal:negative except for left shoulder pain - improved  Neurological: negative except for cervical dystonia  Behavioral/Psych: negative  Endocrine: negative  Allergic/Immunologic: negative    Objective:   Blood pressure 133/67, pulse 84, temperature 97.6 °F (36.4 °C), temperature source Oral, resp. rate 20, height 5' 3\" (1.6 m), weight 112 lb (50.8 kg), SpO2 98 %.    Physical Examination:   General appearance - alert, well appearing, and in no distress and oriented to person, place, and time  Mental status - alert, oriented to person, place, and time, normal mood, behavior, speech, dress, motor activity, and thought processes  Eyes - pupils equal and reactive, extraocular eye movements intact  Ears - bilateral TM's and external ear canals normal  Nose - normal and patent, no erythema, discharge or polyps  Mouth - mucous membranes moist, pharynx normal without lesions  Neck - supple, no significant adenopathy, carotids upstroke normal bilaterally, no bruits, thyroid exam: thyroid is normal in size without nodules or tenderness  Lymphatics - no palpable lymphadenopathy, no hepatosplenomegaly  Chest - clear to auscultation, no wheezes, rales or rhonchi, symmetric air entry  Heart - normal rate, regular rhythm, normal S1, S2, no murmurs, rubs, clicks or gallops  Abdomen - soft, nontender, nondistended, no masses or organomegaly  bowel sounds normal  Breasts - breasts appear normal, no suspicious masses, no skin or nipple changes or axillary nodes  Back exam - full range of motion, no tenderness, palpable spasm or pain on motion  Neurological - alert, oriented, normal speech, no focal findings or movement disorder noted  Musculoskeletal - no joint tenderness, deformity or swelling  Extremities - peripheral pulses normal, no pedal edema, no clubbing or cyanosis  Skin - normal coloration and turgor, no rashes, no suspicious skin lesions noted     Assessment/Plan:   62yo female here for PE  Cervical dystonia/blepharospasm - improved with Botox but continues.   F/u with neurologist and ENT   - check labs, upcoming mammogram next month.    call if any problems  Orders Placed This Encounter    METABOLIC PANEL, COMPREHENSIVE    LIPID PANEL    VITAMIN D, 25 HYDROXY    TSH 3RD GENERATION    CBC W/O DIFF    pseudoephedrine (Sudafed) 30 mg tablet    DISCONTD: esomeprazole (NexIUM) 20 mg capsule

## 2020-11-17 LAB
25(OH)D3+25(OH)D2 SERPL-MCNC: 50.3 NG/ML (ref 30–100)
ALBUMIN SERPL-MCNC: 4.4 G/DL (ref 3.8–4.8)
ALBUMIN/GLOB SERPL: 1.6 {RATIO} (ref 1.2–2.2)
ALP SERPL-CCNC: 50 IU/L (ref 39–117)
ALT SERPL-CCNC: 13 IU/L (ref 0–32)
AST SERPL-CCNC: 18 IU/L (ref 0–40)
BILIRUB SERPL-MCNC: 0.6 MG/DL (ref 0–1.2)
BUN SERPL-MCNC: 22 MG/DL (ref 8–27)
BUN/CREAT SERPL: 27 (ref 12–28)
CALCIUM SERPL-MCNC: 9.7 MG/DL (ref 8.7–10.3)
CHLORIDE SERPL-SCNC: 102 MMOL/L (ref 96–106)
CHOLEST SERPL-MCNC: 210 MG/DL (ref 100–199)
CO2 SERPL-SCNC: 27 MMOL/L (ref 20–29)
CREAT SERPL-MCNC: 0.81 MG/DL (ref 0.57–1)
ERYTHROCYTE [DISTWIDTH] IN BLOOD BY AUTOMATED COUNT: 11.8 % (ref 11.7–15.4)
GLOBULIN SER CALC-MCNC: 2.8 G/DL (ref 1.5–4.5)
GLUCOSE SERPL-MCNC: 94 MG/DL (ref 65–99)
HCT VFR BLD AUTO: 43.7 % (ref 34–46.6)
HDLC SERPL-MCNC: 74 MG/DL
HGB BLD-MCNC: 15 G/DL (ref 11.1–15.9)
INTERPRETATION, 910389: NORMAL
LDLC SERPL CALC-MCNC: 124 MG/DL (ref 0–99)
MCH RBC QN AUTO: 32.5 PG (ref 26.6–33)
MCHC RBC AUTO-ENTMCNC: 34.3 G/DL (ref 31.5–35.7)
MCV RBC AUTO: 95 FL (ref 79–97)
PLATELET # BLD AUTO: 354 X10E3/UL (ref 150–450)
POTASSIUM SERPL-SCNC: 4.5 MMOL/L (ref 3.5–5.2)
PROT SERPL-MCNC: 7.2 G/DL (ref 6–8.5)
RBC # BLD AUTO: 4.62 X10E6/UL (ref 3.77–5.28)
SODIUM SERPL-SCNC: 143 MMOL/L (ref 134–144)
TRIGL SERPL-MCNC: 65 MG/DL (ref 0–149)
TSH SERPL DL<=0.005 MIU/L-ACNC: 2.19 UIU/ML (ref 0.45–4.5)
VLDLC SERPL CALC-MCNC: 12 MG/DL (ref 5–40)
WBC # BLD AUTO: 4.3 X10E3/UL (ref 3.4–10.8)

## 2021-11-04 ENCOUNTER — OFFICE VISIT (OUTPATIENT)
Dept: INTERNAL MEDICINE CLINIC | Age: 66
End: 2021-11-04
Payer: COMMERCIAL

## 2021-11-04 VITALS
OXYGEN SATURATION: 100 % | HEIGHT: 63 IN | SYSTOLIC BLOOD PRESSURE: 131 MMHG | WEIGHT: 107.6 LBS | BODY MASS INDEX: 19.07 KG/M2 | DIASTOLIC BLOOD PRESSURE: 55 MMHG | HEART RATE: 88 BPM | TEMPERATURE: 98 F | RESPIRATION RATE: 14 BRPM

## 2021-11-04 DIAGNOSIS — Z00.00 ROUTINE GENERAL MEDICAL EXAMINATION AT A HEALTH CARE FACILITY: Primary | ICD-10-CM

## 2021-11-04 DIAGNOSIS — Z78.0 POSTMENOPAUSAL: ICD-10-CM

## 2021-11-04 DIAGNOSIS — I65.22 STENOSIS OF LEFT CAROTID ARTERY: ICD-10-CM

## 2021-11-04 PROCEDURE — 99396 PREV VISIT EST AGE 40-64: CPT | Performed by: INTERNAL MEDICINE

## 2021-11-04 NOTE — PROGRESS NOTES
Subjective:   60 y.o. female for Well Woman Check. Her gyne and breast care is done elsewhere by her Ob-Gyne physician. 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  baclofen (LIORESAL) 10 mg tablet Take 5 mg by mouth three (3) times daily. 1/2 tablet by mouth three times daily  11    calcium citrate-vitamin d3 (CITRACAL + D) 315-200 mg-unit tab Take 1 Tab by mouth two (2) times daily (with meals).  botulinum toxin type A (BOTOX) 100 unit injection by IntraMUSCular route once. 70 units four times a year      Cholecalciferol, Vitamin D3, (VITAMIN D3) 1,000 unit cap Take  by mouth.  lactobac cmb #3-fos-pantethine (PROBIOTIC & ACIDOPHILUS) 300-250 million cell-mg cap Take  by mouth.  ACETAMINOPHEN (TYLENOL PO) Takes two po as needed.  gabapentin (NEURONTIN) 100 mg capsule Take 100 mg by mouth three (3) times daily.        Allergies   Allergen Reactions    Diclofenac Sodium Hives, Other (comments) and Nausea and Vomiting    Naproxen Other (comments)    Promethazine Other (comments)    Advair Diskus [Fluticasone Propion-Salmeterol] Other (comments)     agitation    Asa-Acetaminophen-Caff-Potass Unknown (comments)    Aspirin Nausea and Vomiting    Codeine Nausea and Vomiting    Compazine [Prochlorperazine] Unknown (comments)    Flonase [Fluticasone] Swelling     Swelling of lips and tongue.  Ibuprofen Nausea and Vomiting    Nsaids (Non-Steroidal Anti-Inflammatory Drug) Nausea and Vomiting    Other Medication Other (comments)     Cannot take neuroleptics, trazadone, phenergan or compazine due to dystonia. Cannot take blood thinners due to atrial septal defect.  Sulfa (Sulfonamide Antibiotics) Other (comments)     GI Distress     Past Medical History:   Diagnosis Date    Aneurysm of heart NEC     atrial septum    Anxiety     Cataract     left    Chronic pain     due to dystonia    Depression     Diabetes in pregnancy 1997    Dystonia     cranial    GERD (gastroesophageal reflux disease)     Osteoporosis 6/4/2014    Other ill-defined conditions(799.89)     hyperreactive airways    Rosacea     Pt states another physician did not believe she had this, but was symptoms of dystonia.  Umbilical discharge 84/88/7005     Past Surgical History:   Procedure Laterality Date    HX COLONOSCOPY  12/17/2014    [place] q5y    HX HEENT  1978    wisdom teeth    HX HEENT      right cataract surgery    LAPAROSCOPY ABDOMEN DIAGNOSTIC  1984    TX ABDOMEN SURGERY PROC UNLISTED       Family History   Problem Relation Age of Onset    Stroke Mother     Heart Disease Mother     Heart Attack Mother         ?  Osteoporosis Mother     Anxiety Mother     Depression Mother     Hypertension Mother     Cancer Father         thyroid    Stroke Father     Kidney Disease Father         stones    Hypertension Father     Alcohol abuse Brother     Seizures Brother     Hypertension Maternal Grandmother     Kidney Disease Maternal Grandmother         kidney failure due to HTN    Hypertension Maternal Grandfather     Stroke Maternal Grandfather     Stroke Paternal Grandmother     Diabetes Paternal Grandfather      Social History     Tobacco Use    Smoking status: Never Smoker    Smokeless tobacco: Never Used   Substance Use Topics    Alcohol use:  No Alcohol/week: 0.0 standard drinks        Lab Results   Component Value Date/Time    WBC 4.3 11/16/2020 09:16 AM    HGB 15.0 11/16/2020 09:16 AM    HCT 43.7 11/16/2020 09:16 AM    PLATELET 737 89/87/9076 09:16 AM    MCV 95 11/16/2020 09:16 AM     Lab Results   Component Value Date/Time    Glucose 94 11/16/2020 09:16 AM    LDL, calculated 124 (H) 11/16/2020 09:16 AM    LDL, calculated 108 (H) 03/22/2019 11:44 AM    Creatinine 0.81 11/16/2020 09:16 AM      Lab Results   Component Value Date/Time    Cholesterol, total 210 (H) 11/16/2020 09:16 AM    HDL Cholesterol 74 11/16/2020 09:16 AM    LDL, calculated 124 (H) 11/16/2020 09:16 AM    LDL, calculated 108 (H) 03/22/2019 11:44 AM    Triglyceride 65 11/16/2020 09:16 AM    CHOL/HDL Ratio 2.7 03/08/2010 10:27 AM     Lab Results   Component Value Date/Time    ALT (SGPT) 13 11/16/2020 09:16 AM    Alk. phosphatase 50 11/16/2020 09:16 AM    Bilirubin, total 0.6 11/16/2020 09:16 AM    Albumin 4.4 11/16/2020 09:16 AM    Protein, total 7.2 11/16/2020 09:16 AM    PLATELET 923 67/54/4624 09:16 AM     Lab Results   Component Value Date/Time    GFR est non-AA 77 11/16/2020 09:16 AM    GFR est AA 89 11/16/2020 09:16 AM    Creatinine 0.81 11/16/2020 09:16 AM    BUN 22 11/16/2020 09:16 AM    Sodium 143 11/16/2020 09:16 AM    Potassium 4.5 11/16/2020 09:16 AM    Chloride 102 11/16/2020 09:16 AM    CO2 27 11/16/2020 09:16 AM    PTH, Intact 28 10/31/2014 09:13 AM     Lab Results   Component Value Date/Time    TSH 2.190 11/16/2020 09:16 AM         Specific concerns today:   Meige syndrome - seeing Dr. Tiffanie Daly at [facility] who replaced her previous neurologist.  Currently assigned to one of the residents. Getting BOTOX through [facility] - Dr. Doreen Toro (ENT). Recent increased dose of the botox in bilat masseter muscles. Helped with the Dystonia but having more issues with swallowing.   Pieces of food not fully chewed gets stuck, sometimes whole well chewed food bolus doesn't go down but also having issues swallowing her own saliva. Recently had piece of apple stuck that would not come out. OK with liquids, yogurt etc.  Has f/u with neurologist next month followed by botox later in the month. Had swallowing study in 2004 or 2005. Occ heart burn, taking Famotidine occasionally. > if swallowing issues secondary to worsening of dystonia vs botox. Due COVID booster when turns 66 in 2 weeks. UTD with flu shot beginning of September. Due DXA    Trying to walk between 3-6 miles per day. Still doing ballet over zoom 2x per week. Not doing yoga at home during pandemic. Not sleeping well. Knows dystonia affects her sleep. Worried about her son. Review of Systems  Constitutional: negative except for has lost 5 lbs  Eyes: negative  Ears, nose, mouth, throat, and face: negative except for dystonia as above  Respiratory: negative  Cardiovascular: negative  Gastrointestinal: negative except for swallowing issues as above.  gerd controlled  Genitourinary:negative  Integument/breast: negative  Hematologic/lymphatic: negative  Musculoskeletal:negative  Neurological: negative  Behavioral/Psych: negative  Endocrine: negative  Allergic/Immunologic: negative    Objective:   Blood pressure (!) 131/55, pulse 88, temperature 98 °F (36.7 °C), temperature source Temporal, resp. rate 14, height 5' 3\" (1.6 m), weight 107 lb 9.6 oz (48.8 kg), SpO2 100 %. Physical Examination:   General appearance - alert, well appearing, and in no distress  Mental status - alert, oriented to person, place, and time, normal mood, behavior, speech, dress, motor activity, and thought processes  Eyes - pupils equal and reactive, extraocular eye movements intact  Ears - bilateral TM's and external ear canals normal  Nose - normal and patent, no erythema, discharge or polyps  Mouth - mucous membranes moist, pharynx normal without lesions and dystonia of jaw muscles.   Neck - supple, no significant adenopathy, carotids upstroke normal bilaterally, no bruits, thyroid exam: thyroid is normal in size without nodules or tenderness  Lymphatics - no palpable lymphadenopathy, no hepatosplenomegaly  Chest - clear to auscultation, no wheezes, rales or rhonchi, symmetric air entry  Heart - normal rate, regular rhythm, normal S1, S2, no murmurs, rubs, clicks or gallops  Abdomen - soft, nontender, nondistended, no masses or organomegaly  bowel sounds normal  Breasts - breasts appear normal, no suspicious masses, no skin or nipple changes or axillary nodes  Back exam - full range of motion, no tenderness, palpable spasm or pain on motion  Neurological - alert, oriented, normal speech, no focal findings or movement disorder noted  Musculoskeletal - no joint tenderness, deformity or swelling  Extremities - peripheral pulses normal, no pedal edema, no clubbing or cyanosis  Skin - normal coloration and turgor, no rashes, no suspicious skin lesions noted     Assessment/Plan:   59 yo female here for PE  Meige syndrome - worsening swallowing issues. Referred her back to neurologist.  Suspect he needs a swallowing study  Osteoporosis - repeat DXA  gerd - fairly well controlled. Continue Famotidine prn. Carotid stenosis - repeat duplex    HM - check labs.   , dxa  call if any problems  Orders Placed This Encounter    DEXA BONE DENSITY STUDY AXIAL    METABOLIC PANEL, COMPREHENSIVE    LIPID PANEL    CBC W/O DIFF    TSH 3RD GENERATION    VITAMIN D, 25 HYDROXY    METABOLIC PANEL, COMPREHENSIVE    LIPID PANEL    TSH 3RD GENERATION    VITAMIN D, 25 HYDROXY    CBC W/O DIFF

## 2021-11-04 NOTE — PROGRESS NOTES
Reviewed record in preparation for visit and have obtained necessary documentation. Identified pt with two pt identifiers(name and ). Chief Complaint   Patient presents with    Complete Physical     Pt states that she is has been expereincing an hard time staying focus, insomnia, fatigue, and problem sallowing     Blood pressure (!) 131/55, pulse 88, temperature 98 °F (36.7 °C), temperature source Temporal, resp. rate 14, height 5' 3\" (1.6 m), weight 107 lb 9.6 oz (48.8 kg), SpO2 100 %. Health Maintenance Due   Topic Date Due    Bone Densitometry  2021    Yearly Flu Vaccine (1) 2021       Ms. Daphnie Polk has a reminder for a \"due or due soon\" health maintenance. I have asked that she discuss this further with her primary care provider for follow-up on this health maintenance. Coordination of Care Questionnaire:  :     1) Have you been to an emergency room, urgent care clinic since your last visit? no   Hospitalized since your last visit? no             2) Have you seen or consulted any other health care providers outside of [address] [place] [address] since your last visit? yes  (Include any pap smears or colon screenings in this section.)    3) In the event something were to happen to you and you were unable to speak on your behalf, do you have an Advance Directive/ Living Will in place stating your wishes?  NO

## 2021-11-23 LAB
25(OH)D3+25(OH)D2 SERPL-MCNC: 51.9 NG/ML (ref 30–100)
ALBUMIN SERPL-MCNC: 4.4 G/DL (ref 3.8–4.8)
ALBUMIN/GLOB SERPL: 1.9 {RATIO} (ref 1.2–2.2)
ALP SERPL-CCNC: 55 IU/L (ref 44–121)
ALT SERPL-CCNC: 17 IU/L (ref 0–32)
AST SERPL-CCNC: 18 IU/L (ref 0–40)
BILIRUB SERPL-MCNC: 0.7 MG/DL (ref 0–1.2)
BUN SERPL-MCNC: 14 MG/DL (ref 8–27)
BUN/CREAT SERPL: 16 (ref 12–28)
CALCIUM SERPL-MCNC: 10 MG/DL (ref 8.7–10.3)
CHLORIDE SERPL-SCNC: 102 MMOL/L (ref 96–106)
CHOLEST SERPL-MCNC: 203 MG/DL (ref 100–199)
CO2 SERPL-SCNC: 28 MMOL/L (ref 20–29)
CREAT SERPL-MCNC: 0.9 MG/DL (ref 0.57–1)
ERYTHROCYTE [DISTWIDTH] IN BLOOD BY AUTOMATED COUNT: 11.7 % (ref 11.7–15.4)
GLOBULIN SER CALC-MCNC: 2.3 G/DL (ref 1.5–4.5)
GLUCOSE SERPL-MCNC: 100 MG/DL (ref 65–99)
HCT VFR BLD AUTO: 44.9 % (ref 34–46.6)
HDLC SERPL-MCNC: 71 MG/DL
HGB BLD-MCNC: 15.1 G/DL (ref 11.1–15.9)
IMP & REVIEW OF LAB RESULTS: NORMAL
LDLC SERPL CALC-MCNC: 119 MG/DL (ref 0–99)
MCH RBC QN AUTO: 32.8 PG (ref 26.6–33)
MCHC RBC AUTO-ENTMCNC: 33.6 G/DL (ref 31.5–35.7)
MCV RBC AUTO: 98 FL (ref 79–97)
PLATELET # BLD AUTO: 354 X10E3/UL (ref 150–450)
POTASSIUM SERPL-SCNC: 5.1 MMOL/L (ref 3.5–5.2)
PROT SERPL-MCNC: 6.7 G/DL (ref 6–8.5)
RBC # BLD AUTO: 4.6 X10E6/UL (ref 3.77–5.28)
SODIUM SERPL-SCNC: 140 MMOL/L (ref 134–144)
TRIGL SERPL-MCNC: 73 MG/DL (ref 0–149)
TSH SERPL DL<=0.005 MIU/L-ACNC: 2.73 UIU/ML (ref 0.45–4.5)
VLDLC SERPL CALC-MCNC: 13 MG/DL (ref 5–40)
WBC # BLD AUTO: 4.1 X10E3/UL (ref 3.4–10.8)

## 2022-01-14 ENCOUNTER — HOSPITAL ENCOUNTER (OUTPATIENT)
Dept: GENERAL RADIOLOGY | Age: 67
Discharge: HOME | End: 2022-01-14
Attending: INTERNAL MEDICINE
Payer: COMMERCIAL

## 2022-01-14 ENCOUNTER — OFFICE VISIT (OUTPATIENT)
Dept: INTERNAL MEDICINE CLINIC | Age: 67
End: 2022-01-14
Payer: COMMERCIAL

## 2022-01-14 VITALS
OXYGEN SATURATION: 98 % | BODY MASS INDEX: 18.61 KG/M2 | TEMPERATURE: 98 F | HEIGHT: 63 IN | HEART RATE: 92 BPM | DIASTOLIC BLOOD PRESSURE: 64 MMHG | RESPIRATION RATE: 20 BRPM | SYSTOLIC BLOOD PRESSURE: 129 MMHG | WEIGHT: 105 LBS

## 2022-01-14 DIAGNOSIS — M79.18 LEFT BUTTOCK PAIN: ICD-10-CM

## 2022-01-14 DIAGNOSIS — M79.18 LEFT BUTTOCK PAIN: Primary | ICD-10-CM

## 2022-01-14 DIAGNOSIS — R13.19 ESOPHAGEAL DYSPHAGIA: ICD-10-CM

## 2022-01-14 PROCEDURE — 73502 X-RAY EXAM HIP UNI 2-3 VIEWS: CPT

## 2022-01-14 PROCEDURE — 99214 OFFICE O/P EST MOD 30 MIN: CPT | Performed by: INTERNAL MEDICINE

## 2022-01-14 RX ORDER — FAMOTIDINE 20 MG/1
1 TABLET, FILM COATED ORAL 2 TIMES DAILY
COMMUNITY
Start: 2022-01-10 | End: 2022-04-22

## 2022-01-14 NOTE — PROGRESS NOTES
HISTORY OF PRESENT ILLNESS  Josph Halsted is a 73 y.o. female. HPI  Left hip and leg pain s/p fall on 1/4. Tenderness left sitz bone and worse when sitting/lying on area. Has not improved over the last week. Pain radiates to left lateral hip and groin. Bruising in the area. Better with mild activity. Short walk of 1.5 miles yesterday and sore after. Taking Tylenol prn which marginally helps. Continued issues swallowing, worse with rice. Tens to occur upper exophagus but recent episode mid esophagus. Dicussed with neurologist - wanted us to order swallowing study to r/o other causes. Put her on famotidine 20mg bid to r/o GERD - started earlier this week. Will also discussed with MD administering her BOTOX for her cervical dystonia. Current Outpatient Medications   Medication Instructions    ACETAMINOPHEN (TYLENOL PO) Takes two po as needed.  baclofen (LIORESAL) 5 mg, Oral, 3 TIMES DAILY, 1/2 tablet by mouth three times daily    botulinum toxin type A (BOTOX) 100 unit injection IntraMUSCular, ONCE, 70 units four times a year    calcium citrate-vitamin d3 (CITRACAL + D) 315-200 mg-unit tab 1 Tablet, Oral, 2 TIMES DAILY WITH MEALS    Cholecalciferol, Vitamin D3, (VITAMIN D3) 1,000 unit cap Take  by mouth.  famotidine (PEPCID) 20 mg tablet 1 Tablet, Oral, 2 TIMES DAILY    gabapentin (NEURONTIN) 100 mg, 3 TIMES DAILY    lactobac cmb #3-fos-pantethine (PROBIOTIC & ACIDOPHILUS) 300-250 million cell-mg cap Take  by mouth.  pseudoephedrine (Sudafed) 30 mg tablet Oral, EVERY 4 HOURS AS NEEDED       Visit Vitals  /64   Pulse 92   Temp 98 °F (36.7 °C) (Temporal)   Resp 20   Ht 5' 3\" (1.6 m)   Wt 105 lb (47.6 kg)   SpO2 98%   BMI 18.60 kg/m²       ROS  See above  Physical Exam  Vitals reviewed. Constitutional:       Appearance: Normal appearance. HENT:      Head: Normocephalic and atraumatic.    Musculoskeletal:      Comments: Lspine, paraspinal muscles, buttocks and sacrum nontender. Tender left ischial tuberosity. Trochanteric bursas nontender. Full rom in hips but tight hamstrings. Neurological:      Mental Status: She is alert and oriented to person, place, and time. ASSESSMENT and PLAN  Left buttocks/ischial tuberosity pain - ? Fracture bs bruise. Check xray,  Gentle stretching, heat to area  Esophageal dysphagia/dysmotility - barium swallow. cotninue famotidine.     Orders Placed This Encounter    XR HIP LT W OR WO PELV 2-3 VWS    XR BA SWALLOW ESOPHOGRAM    famotidine (PEPCID) 20 mg tablet

## 2022-01-14 NOTE — PROGRESS NOTES
Chief Complaint   Patient presents with    Leg Pain     unilateral left due to fall    Hip Pain     unilateral left due to fall     1. Have you been to the ER, urgent care clinic since your last visit? Hospitalized since your last visit? No    2. Have you seen or consulted any other health care providers outside of the [address] [place] [address] since your last visit? Include any pap smears or colon screening. Yes.      Visit Vitals  /64   Pulse 92   Temp 98 °F (36.7 °C) (Temporal)   Resp 20   Ht 5' 3\" (1.6 m)   Wt 105 lb (47.6 kg)   LMP  (LMP Unknown)   SpO2 98%   BMI 18.60 kg/m²

## 2022-01-28 ENCOUNTER — HOSPITAL ENCOUNTER (OUTPATIENT)
Dept: VASCULAR SURGERY | Age: 67
Discharge: HOME | End: 2022-01-28
Attending: INTERNAL MEDICINE
Payer: COMMERCIAL

## 2022-01-28 VITALS — DIASTOLIC BLOOD PRESSURE: 57 MMHG | SYSTOLIC BLOOD PRESSURE: 133 MMHG

## 2022-01-28 DIAGNOSIS — I65.22 STENOSIS OF LEFT CAROTID ARTERY: ICD-10-CM

## 2022-01-28 PROCEDURE — 93880 EXTRACRANIAL BILAT STUDY: CPT

## 2022-01-30 LAB
LEFT CCA DIST DIAS: 23 CM/S
LEFT CCA DIST SYS: 105.2 CM/S
LEFT CCA PROX DIAS: 17.5 CM/S
LEFT CCA PROX SYS: 112.5 CM/S
LEFT ECA DIAS: 9.5 CM/S
LEFT ECA SYS: 79.8 CM/S
LEFT ICA DIST DIAS: 29 CM/S
LEFT ICA DIST SYS: 106.4 CM/S
LEFT ICA MID DIAS: 28.1 CM/S
LEFT ICA MID SYS: 85.3 CM/S
LEFT ICA PROX DIAS: 17.1 CM/S
LEFT ICA PROX SYS: 84.2 CM/S
LEFT ICA/CCA SYS: 1
LEFT VERTEBRAL DIAS: 17.9 CM/S
LEFT VERTEBRAL SYS: 67.1 CM/S
RIGHT CCA DIST DIAS: 21.6 CM/S
RIGHT CCA DIST SYS: 80.3 CM/S
RIGHT CCA PROX DIAS: 19 CM/S
RIGHT CCA PROX SYS: 85.5 CM/S
RIGHT ECA DIAS: 16.7 CM/S
RIGHT ECA SYS: 130.8 CM/S
RIGHT ICA DIST DIAS: 35.1 CM/S
RIGHT ICA DIST SYS: 102.7 CM/S
RIGHT ICA MID DIAS: 24.1 CM/S
RIGHT ICA MID SYS: 83 CM/S
RIGHT ICA PROX DIAS: 11.6 CM/S
RIGHT ICA PROX SYS: 80.9 CM/S
RIGHT ICA/CCA SYS: 1.3
RIGHT VERTEBRAL DIAS: 10 CM/S
RIGHT VERTEBRAL SYS: 62 CM/S

## 2022-02-03 ENCOUNTER — PATIENT MESSAGE (OUTPATIENT)
Dept: INTERNAL MEDICINE CLINIC | Age: 67
End: 2022-02-03

## 2022-02-03 ENCOUNTER — TELEPHONE (OUTPATIENT)
Dept: INTERNAL MEDICINE CLINIC | Age: 67
End: 2022-02-03

## 2022-02-03 ENCOUNTER — HOSPITAL ENCOUNTER (OUTPATIENT)
Dept: GENERAL RADIOLOGY | Age: 67
Discharge: HOME | End: 2022-02-03
Attending: INTERNAL MEDICINE
Payer: COMMERCIAL

## 2022-02-03 DIAGNOSIS — R13.19 ESOPHAGEAL DYSPHAGIA: ICD-10-CM

## 2022-02-03 PROCEDURE — 74220 X-RAY XM ESOPHAGUS 1CNTRST: CPT

## 2022-02-03 NOTE — TELEPHONE ENCOUNTER
Spoke with patient states that she is experiencing stomach cramps after  Barium swallow. Patient states that she has not been drinking water through out the day. Advise patient to drink plenty of water to help flush out  her system.  And if pain is increased then to go to the ER

## 2022-02-11 ENCOUNTER — OFFICE VISIT (OUTPATIENT)
Dept: INTERNAL MEDICINE CLINIC | Age: 67
End: 2022-02-11
Payer: COMMERCIAL

## 2022-02-11 VITALS
RESPIRATION RATE: 14 BRPM | BODY MASS INDEX: 18.54 KG/M2 | WEIGHT: 104.6 LBS | OXYGEN SATURATION: 98 % | HEIGHT: 63 IN | DIASTOLIC BLOOD PRESSURE: 69 MMHG | HEART RATE: 98 BPM | TEMPERATURE: 98.4 F | SYSTOLIC BLOOD PRESSURE: 118 MMHG

## 2022-02-11 DIAGNOSIS — Z23 ENCOUNTER FOR IMMUNIZATION: ICD-10-CM

## 2022-02-11 DIAGNOSIS — T17.908A ASPIRATION INTO AIRWAY, INITIAL ENCOUNTER: ICD-10-CM

## 2022-02-11 DIAGNOSIS — R13.10 DYSPHAGIA, UNSPECIFIED TYPE: Primary | ICD-10-CM

## 2022-02-11 DIAGNOSIS — R73.9 ELEVATED BLOOD SUGAR: ICD-10-CM

## 2022-02-11 DIAGNOSIS — K22.4 ESOPHAGEAL DYSMOTILITY: ICD-10-CM

## 2022-02-11 DIAGNOSIS — K21.9 GASTROESOPHAGEAL REFLUX DISEASE, UNSPECIFIED WHETHER ESOPHAGITIS PRESENT: ICD-10-CM

## 2022-02-11 LAB — HBA1C MFR BLD HPLC: 5.2 %

## 2022-02-11 PROCEDURE — 99214 OFFICE O/P EST MOD 30 MIN: CPT | Performed by: INTERNAL MEDICINE

## 2022-02-11 PROCEDURE — 90471 IMMUNIZATION ADMIN: CPT | Performed by: INTERNAL MEDICINE

## 2022-02-11 PROCEDURE — 83036 HEMOGLOBIN GLYCOSYLATED A1C: CPT | Performed by: INTERNAL MEDICINE

## 2022-02-11 PROCEDURE — 90732 PPSV23 VACC 2 YRS+ SUBQ/IM: CPT | Performed by: INTERNAL MEDICINE

## 2022-02-11 RX ORDER — EPINEPHRINE 0.3 MG/.3ML
0.3 INJECTION SUBCUTANEOUS
Qty: 2 EACH | Refills: 0 | Status: SHIPPED | OUTPATIENT
Start: 2022-02-11 | End: 2022-02-11

## 2022-02-11 NOTE — PROGRESS NOTES
Reviewed record in preparation for visit and have obtained necessary documentation. Identified pt with two pt identifiers(name and ). Chief Complaint   Patient presents with    Follow-up     Vitals:    22 0942   BP: 118/69   Pulse: 98   Resp: 14   Temp: 98.4 °F (36.9 °C)   TempSrc: Temporal   SpO2: 98%   Weight: 104 lb 9.6 oz (47.4 kg)   Height: 5' 3\" (1.6 m)        Health Maintenance Due   Topic Date Due    Bone Densitometry  2021    Pneumococcal Vaccine (1  - PPSV23) Never done       Ms. [place] has a reminder for a \"due or due soon\" health maintenance. I have asked that she discuss this further with her primary care provider for follow-up on this health maintenance. Coordination of Care Questionnaire:  :     1) Have you been to an emergency room, urgent care clinic since your last visit? no   Hospitalized since your last visit? no             2) Have you seen or consulted any other health care providers outside of [address] [place] [address] since your last visit? yes  (Include any pap smears or colon screenings in this section.)    3) In the event something were to happen to you and you were unable to speak on your behalf, do you have an Advance Directive/ Living Will in place stating your wishes? No      Do you have an Advance Directive on file? no    4) Are you interested in receiving information on Advance Directives? YES    Patient is accompanied by self I have received verbal consent from Erica Pickard to discuss any/all medical information while they are present in the room.

## 2022-02-11 NOTE — PROGRESS NOTES
HISTORY OF PRESENT ILLNESS  Paco Schwarz is a 73 y.o. female. 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 After last visit had Barium swallow which showed aspiration of thick barium with slight associated cough, mild esophageal dysmotility with sluggish clearing and reflux to the upper thoracic esophagus without hiatal hernia, esophagitis or stricture. Currently on famotidine and felt GERd was well controlled. ENT at [facility] has not seen results but feels botox is not causing the swallowing issues. Patient had to cancel Dec and Jan appointments and then Feb and March appointments were canceled due to lack of Botox. Neurologist at [facility] does not feel dysphagia is secondary to her dystonia and wants her to f/u with ENT re this issue. Saw GI on Wednesday, Dr. Crystal Frank. Wants to do manometry (March) and upper endoscopy (April) to further assess the reflux and dysmotility. Wary of PPIs due to her hx of osteoporosis. Food gets stuck base of neck. Coughing when eating to clear food. Swallowing is a struggle. Has Speech PT for for dysphagia in 2005 - was eating too large of bolus of food at that time. DXA scan is next week. On recent labs, Glucose was 100. Cut out OJ fortified with calcium and changed to milk. Did have a hx of gestational DM and pGF with type II DM. Tends to eat a pretty carb heavy diet. Cut back out of some carbs and has lost weight. Last year stung by yellow jackets x 2 on wrist and 1 in ankle. Arm was red and swollen up to elbow. More mild swelling of ankle. Used meat tenderizer. Started benedryl the following day. Had immunotx at age 11-7. Has had local reaction to stings since that time. Never had swallowing or face swelling issues. Increased stress over health o fson.         Current Outpatient Medications   Medication Instructions    ACETAMINOPHEN (TYLENOL PO) Takes two po as needed.  baclofen (LIORESAL) 5 mg, Oral, 3 TIMES DAILY, 1/2 tablet by mouth three times daily    botulinum toxin type A (BOTOX) 100 unit injection ONCE    calcium citrate-vitamin d3 (CITRACAL + D) 315-200 mg-unit tab 1 Tablet, Oral, 2 TIMES DAILY WITH MEALS    Cholecalciferol, Vitamin D3, (VITAMIN D3) 1,000 unit cap Take  by mouth.  famotidine (PEPCID) 20 mg tablet 1 Tablet, Oral, 2 TIMES DAILY    gabapentin (NEURONTIN) 100 mg, 3 TIMES DAILY    lactobac cmb #3-fos-pantethine (PROBIOTIC & ACIDOPHILUS) 300-250 million cell-mg cap Take  by mouth.  pseudoephedrine (Sudafed) 30 mg tablet Oral, EVERY 4 HOURS AS NEEDED       Visit Vitals  /69 (BP 1 Location: Left upper arm, BP Patient Position: Sitting, BP Cuff Size: Adult)   Pulse 98   Temp 98.4 °F (36.9 °C) (Temporal)   Resp 14   Ht 5' 3\" (1.6 m)   Wt 104 lb 9.6 oz (47.4 kg)   SpO2 98%   BMI 18.53 kg/m²       ROS  See above  Physical Exam  Vitals reviewed. Constitutional:       Appearance: Normal appearance. HENT:      Head: Normocephalic and atraumatic. Neck:      Vascular: No carotid bruit. Cardiovascular:      Rate and Rhythm: Normal rate and regular rhythm. Pulses: Normal pulses. Heart sounds: Normal heart sounds. Pulmonary:      Effort: Pulmonary effort is normal.      Breath sounds: Normal breath sounds. Abdominal:      General: Bowel sounds are normal. There is no distension. Palpations: Abdomen is soft. There is no mass. Tenderness: There is no abdominal tenderness. Musculoskeletal:      Cervical back: Neck supple. Lymphadenopathy:      Cervical: No cervical adenopathy. Neurological:      Mental Status: She is alert. Results for orders placed or performed in visit on 02/11/22   AMB POC HEMOGLOBIN A1C   Result Value Ref Range    Hemoglobin A1c (POC) 5.2 %     ASSESSMENT and PLAN  Dysphagia with aspiration, dysmotility of esophagus and Reflux - agree with w/u by Dr. Norma Cardenas.   I feel the dystonia is contributing. Speech PT referral.    Elevated BS - repeat A1C  Large localized reaction of yellow jacket - epi pen prn.  benedryl prn. HM - pneumovax given today.     Orders Placed This Encounter    PNEUMOCOCCAL POLYSACCHARIDE VACCINE, 23-VALENT, ADULT OR IMMUNOSUPPRESSED PT DOSE,    REFERRAL TO SPEECH THERAPY    AMB POC HEMOGLOBIN A1C    EPINEPHrine (EpiPen) 0.3 mg/0.3 mL injection

## 2022-02-22 ENCOUNTER — VIRTUAL VISIT (OUTPATIENT)
Dept: INTERNAL MEDICINE CLINIC | Age: 67
End: 2022-02-22
Payer: COMMERCIAL

## 2022-02-22 DIAGNOSIS — K21.9 GASTROESOPHAGEAL REFLUX DISEASE, UNSPECIFIED WHETHER ESOPHAGITIS PRESENT: ICD-10-CM

## 2022-02-22 DIAGNOSIS — R13.10 DYSPHAGIA, UNSPECIFIED TYPE: Primary | ICD-10-CM

## 2022-02-22 DIAGNOSIS — K22.4 ESOPHAGEAL DYSMOTILITY: ICD-10-CM

## 2022-02-22 PROCEDURE — 99213 OFFICE O/P EST LOW 20 MIN: CPT | Performed by: INTERNAL MEDICINE

## 2022-02-22 RX ORDER — EPINEPHRINE 0.3 MG/.3ML
0.3 INJECTION SUBCUTANEOUS
COMMUNITY
Start: 2022-02-11

## 2022-02-22 NOTE — PROGRESS NOTES
This is an established visit conducted via telemedicine. The patient has been instructed that this meets HIPAA criteria and acknowledges and agrees to this method of visitation. Identified pt with two pt identifiers(name and ). Chief Complaint   Patient presents with    Follow-up       Health Maintenance Due   Topic Date Due    Bone Densitometry  2021       Ms. Anne Stubbs has a reminder for a \"due or due soon\" health maintenance. I have asked that she discuss this further with her primary care provider for follow-up on this health maintenance.   Send link to email

## 2022-02-22 NOTE — PROGRESS NOTES
Elvin Mcneil, who was evaluated through a synchronous (real-time) audio-video encounter, and/or her healthcare decision maker, is aware that it is a billable service, which includes applicable co-pays, with coverage as determined by her insurance carrier. She provided verbal consent to proceed and patient identification was verified. This visit was conducted pursuant to the emergency declaration under the [facility], [address] [place] [address] authority and the [employer] General Act. A caregiver was present when appropriate. Ability to conduct physical exam was limited. The patient was located at home in a state where the provider was licensed to provide care. --Iqra Sifuentes MD on 2/22/2022 at 10:41 AM              HISTORY OF PRESENT ILLNESS  Elvin Mcneil is a 73 y.o. female. HPI  Here for f/u of dysphagia issues. Had barium swallow showing had Barium swallow which showed aspiration of thick barium with slight associated cough, mild esophageal dysmotility with sluggish clearing and reflux to the upper thoracic esophagus without hiatal hernia, esophagitis or stricture. Felt secondary to dystonia. Referred to speech therapy. She was tyring to get in contact with her neurologist in [facility] who does her botox injections. Has talked to speech therapist with [facility]. Recommended MBS vs FEES (Fiberoptic Endoscopic Evaluation of Swallowing) study for further information. Current Outpatient Medications   Medication Instructions    ACETAMINOPHEN (TYLENOL PO) Takes two po as needed.      baclofen (LIORESAL) 5 mg, Oral, 3 TIMES DAILY, 1/2 tablet by mouth three times daily    botulinum toxin type A (BOTOX) 100 unit injection IntraMUSCular, ONCE, 70 units four times a year    calcium citrate-vitamin d3 (CITRACAL + D) 315-200 mg-unit tab 1 Tablet, Oral, 2 TIMES DAILY WITH MEALS    Cholecalciferol, Vitamin D3, (VITAMIN D3) 1,000 unit cap Take  by mouth.  EPINEPHrine (EPIPEN) 0.3 mg, IntraMUSCular, ONCE PRN, For up to 1 dose    famotidine (PEPCID) 20 mg tablet 1 Tablet, Oral, 2 TIMES DAILY    gabapentin (NEURONTIN) 100 mg, 3 TIMES DAILY    lactobac cmb #3-fos-pantethine (PROBIOTIC & ACIDOPHILUS) 300-250 million cell-mg cap Take  by mouth.  pseudoephedrine (Sudafed) 30 mg tablet Oral, EVERY 4 HOURS AS NEEDED       There were no vitals taken for this visit. Patient-Reported Vitals 2/22/2022   Patient-Reported Weight 103.5   Patient-Reported Height 53.3   Patient-Reported Pulse 75   Patient-Reported Temperature 97   Patient-Reported SpO2 98   Patient-Reported Systolic  880   Patient-Reported Diastolic 68      ROS  See above  Physical Exam  Vitals reviewed. Constitutional:       Appearance: Normal appearance. HENT:      Head: Normocephalic and atraumatic. Neck:      Comments: nml appearance  Pulmonary:      Effort: Pulmonary effort is normal.      Comments: nml rate  Neurological:      Mental Status: She is alert. ASSESSMENT and PLAN  Dysphagia - most likely secondary to dystonia and GERD. Upcoming esophageal manometry with Dr. Satish Husain. Will refer to speech therapist with [facility]. Wonders about FEES study vs MBS. Will refer this question back to her ENT. Continue famotidine.     Orders Placed This Encounter    EPINEPHrine (EPIPEN) 0.3 mg/0.3 mL injection

## 2022-02-24 DIAGNOSIS — Z78.0 POSTMENOPAUSAL: ICD-10-CM

## 2022-02-28 ENCOUNTER — TELEPHONE (OUTPATIENT)
Dept: INTERNAL MEDICINE CLINIC | Age: 67
End: 2022-02-28

## 2022-02-28 DIAGNOSIS — R13.10 DYSPHAGIA, UNSPECIFIED TYPE: Primary | ICD-10-CM

## 2022-02-28 DIAGNOSIS — G24.3 CERVICAL DYSTONIA: ICD-10-CM

## 2022-03-01 NOTE — TELEPHONE ENCOUNTER
Regarding: Modified Barium Swallow  ----- Message from Mely Velasquez sent at 2/28/2022  1:28 PM EST -----       ----- Message from Pam Ash to Anna Barr MD sent at 2/28/2022  1:12 PM -----   So Dr. Simone Gil, my ENT in [place], recommends Modified Barium Swallow; he feels it would be better than the FEES test because, he says, it will show more. Could you send in a req to [facility]akat for an MBS? If I can get it soon, I might have the results before I see him on the 15th. He can authorize it too, of course, but I dont want to have to go all the way down to [place] to get it. If I cant get it before the 15th, I would at least have pretty soon.     Thanks,    [facility]	[place]

## 2022-03-03 ENCOUNTER — HOSPITAL ENCOUNTER (OUTPATIENT)
Dept: PREADMISSION TESTING | Age: 67
Discharge: HOME | End: 2022-03-03
Attending: INTERNAL MEDICINE
Payer: COMMERCIAL

## 2022-03-03 ENCOUNTER — TRANSCRIBE ORDER (OUTPATIENT)
Dept: REGISTRATION | Age: 67
End: 2022-03-03

## 2022-03-03 DIAGNOSIS — U07.1 COVID-19: Primary | ICD-10-CM

## 2022-03-03 DIAGNOSIS — U07.1 COVID-19: ICD-10-CM

## 2022-03-03 LAB
SARS-COV-2, XPLCVT: NOT DETECTED
SOURCE, COVRS: NORMAL

## 2022-03-03 PROCEDURE — U0005 INFEC AGEN DETEC AMPLI PROBE: HCPCS

## 2022-03-07 ENCOUNTER — HOSPITAL ENCOUNTER (OUTPATIENT)
Age: 67
Setting detail: OUTPATIENT SURGERY
Discharge: HOME | End: 2022-03-07
Attending: INTERNAL MEDICINE | Admitting: INTERNAL MEDICINE
Payer: COMMERCIAL

## 2022-03-07 VITALS
HEART RATE: 70 BPM | SYSTOLIC BLOOD PRESSURE: 137 MMHG | OXYGEN SATURATION: 97 % | DIASTOLIC BLOOD PRESSURE: 51 MMHG | RESPIRATION RATE: 15 BRPM

## 2022-03-07 PROCEDURE — 76040000007: Performed by: INTERNAL MEDICINE

## 2022-03-07 PROCEDURE — 74011000250 HC RX REV CODE- 250: Performed by: INTERNAL MEDICINE

## 2022-03-07 PROCEDURE — 2709999900 HC NON-CHARGEABLE SUPPLY: Performed by: INTERNAL MEDICINE

## 2022-03-07 RX ORDER — LIDOCAINE HYDROCHLORIDE 20 MG/ML
JELLY TOPICAL ONCE
Status: COMPLETED | OUTPATIENT
Start: 2022-03-07 | End: 2022-03-07

## 2022-03-07 RX ADMIN — LIDOCAINE HYDROCHLORIDE 5 ML: 20 JELLY TOPICAL at 10:08

## 2022-03-07 NOTE — DISCHARGE INSTRUCTIONS
Lanette Jacobson  [id number]  1955      MANOMETRY DISCHARGE INSTRUCTION    You may resume your regular diet as tolerated. You may resume your normal daily activities. If you develop a sore throat- throat lozenges or warm salt water gargles will help. Call your Physician if you have any complications or questions. [employer] Activation    Thank you for requesting access to [employer]. Please follow the instructions below to securely access and download your online medical record. [employer] allows you to send messages to your doctor, view your test results, renew your prescriptions, schedule appointments, and more. How Do I Sign Up? 1. In your internet browser, go to [email or web address]  2. Click on the First Time User? Click Here link in the Sign In box. You will be redirect to the New Member Sign Up page. 3. Enter your [employer] Access Code exactly as it appears below. You will not need to use this code after youve completed the sign-up process. If you do not sign up before the expiration date, you must request a new code. [employer] Access Code: Activation code not generated  Current [employer] Status: Active (This is the date your [employer] access code will )    4. Enter the last four digits of your Social Security Number (xxxx) and Date of Birth (mm/dd/yyyy) as indicated and click Submit. You will be taken to the next sign-up page. 5. Create a [employer] ID. This will be your [employer] login ID and cannot be changed, so think of one that is secure and easy to remember. 6. Create a [employer] password. You can change your password at any time. 7. Enter your Password Reset Question and Answer. This can be used at a later time if you forget your password. 8. Enter your e-mail address. You will receive e-mail notification when new information is available in [address] 9. Click Sign Up. You can now view and download portions of your medical record.   10. Click the Download Summary menu link to download a portable copy of your medical information. Additional Information    If you have questions, please visit the Frequently Asked Questions section of the [employer] website at [email or web address] Remember, [employer] is NOT to be used for urgent needs. For medical emergencies, dial 911.

## 2022-03-10 ENCOUNTER — HOSPITAL ENCOUNTER (OUTPATIENT)
Dept: GENERAL RADIOLOGY | Age: 67
Discharge: HOME | End: 2022-03-10
Attending: INTERNAL MEDICINE
Payer: COMMERCIAL

## 2022-03-10 DIAGNOSIS — R13.10 DYSPHAGIA, UNSPECIFIED TYPE: ICD-10-CM

## 2022-03-10 DIAGNOSIS — G24.3 CERVICAL DYSTONIA: ICD-10-CM

## 2022-03-10 PROCEDURE — 74230 X-RAY XM SWLNG FUNCJ C+: CPT

## 2022-03-10 PROCEDURE — 92611 MOTION FLUOROSCOPY/SWALLOW: CPT

## 2022-03-10 NOTE — PROGRESS NOTES
[address] STUDY  Patient: Hector Matute (89 y.o. female)  Date: 3/10/2022  Referring Provider:  Dr. Tierra Guzman:   Pt is a 72 yo f with long-standing history of cranial dystonia. Patient reports that she has been having difficulty swallowing with several issues. One issue is that she feels that her muscles sometimes have difficulty coordinating the swallow. She states that sometimes she has difficulty initiating a swallow. Sometimes it feels like she has difficulty with food feeling like it's getting stuck or coming up to nasopharynx. Pt had suspected esophageal dysphagia and had esophagram/barium swallow a few weeks ago. This showed aspiration of thick barium and esophageal dysmotility with reflux. Patient states that she feels she has answers for her esophageal dysphagia, but is concerned about aspiration on the esophagram.      OBJECTIVE:   Past Medical History:   Past Medical History:   Diagnosis Date    Aneurysm of heart NEC     atrial septum    Anxiety     Cataract     left    Chronic pain     due to dystonia    Depression     Diabetes in pregnancy 1997    Dystonia     cranial    GERD (gastroesophageal reflux disease)     Osteoporosis 6/4/2014    Other ill-defined conditions(799.89)     hyperreactive airways    Rosacea     Pt states another physician did not believe she had this, but was symptoms of dystonia.     Umbilical discharge 85/93/3842     Past Surgical History:   Procedure Laterality Date    HX COLONOSCOPY  12/17/2014    [facility] q5y    HX HEENT  1978    wisdom teeth    HX HEENT      right cataract surgery    LAPAROSCOPY ABDOMEN DIAGNOSTIC  1984    MA ABDOMEN SURGERY PROC UNLISTED       Current Dietary Status:  Soft solids/thin liquids  Radiologist: Dr. Tari Jonas Views: Lateral;Fluoro  Patient Position: standing    Trial 1:   Consistency Presented: Thin liquid;Puree; Solid   How Presented: Self-fed/presented;Cup/sip; Successive swallows;Spoon       Bolus Acceptance: No impairment   Bolus Formation/Control: No impairment:     Propulsion: No impairment   Oral Residue: None   Initiation of Swallow: No impairment;Triggered at base of tongue   Timing: No impairment   Penetration: None   Aspiration/Timing: No evidence of aspiration   Pharyngeal Clearance: Vallecular residue (likely from decreased relaxation of UES)                     Decreased Tongue Base Retraction?: No  Laryngeal Elevation: WFL (within functional limits)  Aspiration/Penetration Score: 1 (No penetration or aspiration-Contrast does not enter the airway)  Pharyngeal Symmetry: Not assessed  Pharyngeal-Esophageal Segment: Decreased relaxation of upper esophageal segment;Esophageal reflux; Suspected esophageal dysphagia  Pharyngeal Dysfunction:  (pharyngeal phase impacted by esophageal dysphagia)    Oral Phase Severity: No impairment  Pharyngeal Phase Severity:  (pharyngeal deficits likely from esophageal dysphagia)    ASSESSMENT :  Based on the objective data described above, on this date, the patient presents with functional oral phase of swallow with some pharyngeal dysphagia as a result of esophageal dysphagia. Pt with no aspiration nor penetration with any consistencies during the study. However, as study continued and pt trialing puree and solid consistencies, noted to have slowed movement through the UES consistent with decreased relaxation of UES (likely from dystonia) which caused vallecular residue to build. Pt able to clear a lot of the vallecular residue with thin liquid wash. Pt also noted to have some proximal escape in the UES, however, this did not travel to the piriform sinuses. Suspect at this juncture that pt's dysphagia mostly related to her known esophageal deficits, particularly as the UES is impacting her pharyngeal swallow.  Pt's cranial dystonia can certainly play a factor, and presentation may wax and wane because of this. Aspiration present on esophagram could have been 2/2 positioning, or esophageal stasis impacting pt's pharyngeal pressures causing particularly thicker bolus to pass through the path of least resistance. Pt's reports of globus sensation could certainly be 2/2 to dysmotility, as vagus nerve localizes sensation to the thyroid even if it the obstruction is further down in the esophagus. However, given results of this study, recommend pt continue soft solids and thin liquids as she has been. Educated pt extensively on continuing good oral care, maintaining good mobility as pt is a risk for aspiration pneumonia, and educated for signs of aspiration pneumonia. Furthermore, discussed at length esophageal precautions. PLAN/RECOMMENDATIONS :  --soft solids/thin liquids  --good oral care  --upright during meals and for at least 30 minutes following  --alternate a bite of food with a sip of liquid  --small sips/bites  --extra sauces and gravies to moisten food.       COMMUNICATION/EDUCATION:   The above findings and recommendations were discussed with patient and faxed to Dr. Figueroa and Dr. Fadi Sams in [place] per patient's request.    Thank you for this referral.  Tameka Hameed, SLP  Time Calculation: 60 mins

## 2022-03-16 NOTE — PROCEDURES
Esophageal High-Resolution Manometry Report Summary     Date HRM Performed: 3/7/22  Referring physician: Self   Indication: Dysphagia, Oral Dystonia  ? PROCEDURE:   A solid-state recording assembly comprised of 36 circumferential pressure sensors spaced at 1 cm intervals was placed transnasally into the esophagus and positioned through the EGJ. The patient was positioned in the supine position. Mean EGJ junction pressures were measured during a 30-second baseline recording during which the patient was instructed to minimize swallowing. Contractility and pressurization pattern was assessed during ten 5-ml water swallows in the supine position at least 20-30 seconds apart, to generate the [facility] Classification diagnosis. Moreover, multiple rapid swallows (5 2-mL water swallows <3 seconds apart) were performed. ? RESULTS:   Assembly traversed diaphragm? Yes   Location of proximal border of LES: 45 cm  EGJ morphology: Type I   LES-CD separation: 0 cm   End-expiratory LESP: 37 mm Hg (nl 4.8-32.0 mm Hg)  Mid-respiratory LESP: 55 mm Hg (nl 13-43 mm Hg)  Mean IRP: 22 mm Hg (nl <15 mm Hg)  Mean DCI: 4789  mm Hg-cm-sec (nl 450-8000)  Swallows: 9/10 with evidence of complete transit seen on impedance. MRS with augmentation is intact. ?  IMPRESSION:  EGJ: The EGJ morphology is consistent with type I and is normotensive. There is evidence of elevated EGJ outflow   pressures based on IRP. Esophageal body: The contractile pattern is consistent with normal peristalsis/contractility. These findings are consistent with a [place] Classification diagnosis of EGJ outflow obstruction.

## 2022-03-19 PROBLEM — G24.3 CERVICAL DYSTONIA: Status: ACTIVE | Noted: 2017-02-24

## 2022-03-20 PROBLEM — H26.492 POSTERIOR CAPSULAR OPACIFICATION VISUALLY SIGNIFICANT OF LEFT EYE: Status: ACTIVE | Noted: 2018-01-23

## 2022-04-22 RX ORDER — ACETAMINOPHEN 325 MG/1
650 TABLET ORAL
COMMUNITY

## 2022-04-29 ENCOUNTER — ANESTHESIA EVENT (OUTPATIENT)
Dept: ENDOSCOPY | Age: 67
End: 2022-04-29
Payer: COMMERCIAL

## 2022-04-29 ENCOUNTER — ANESTHESIA (OUTPATIENT)
Dept: ENDOSCOPY | Age: 67
End: 2022-04-29
Payer: COMMERCIAL

## 2022-04-29 ENCOUNTER — HOSPITAL ENCOUNTER (OUTPATIENT)
Age: 67
Setting detail: OUTPATIENT SURGERY
Discharge: HOME | End: 2022-04-29
Attending: INTERNAL MEDICINE | Admitting: INTERNAL MEDICINE
Payer: COMMERCIAL

## 2022-04-29 VITALS
WEIGHT: 101.5 LBS | HEIGHT: 63 IN | DIASTOLIC BLOOD PRESSURE: 65 MMHG | SYSTOLIC BLOOD PRESSURE: 117 MMHG | OXYGEN SATURATION: 100 % | TEMPERATURE: 97.5 F | HEART RATE: 59 BPM | RESPIRATION RATE: 12 BRPM | BODY MASS INDEX: 17.98 KG/M2

## 2022-04-29 PROCEDURE — 77030018712 HC DEV BLLN INFL BSC -B: Performed by: INTERNAL MEDICINE

## 2022-04-29 PROCEDURE — 2709999900 HC NON-CHARGEABLE SUPPLY: Performed by: INTERNAL MEDICINE

## 2022-04-29 PROCEDURE — 77030041453 HC CAPSULE BRAVO REFLX SYS GVM -B: Performed by: INTERNAL MEDICINE

## 2022-04-29 PROCEDURE — C1726 CATH, BAL DIL, NON-VASCULAR: HCPCS | Performed by: INTERNAL MEDICINE

## 2022-04-29 PROCEDURE — 76060000031 HC ANESTHESIA FIRST 0.5 HR: Performed by: INTERNAL MEDICINE

## 2022-04-29 PROCEDURE — 74011250636 HC RX REV CODE- 250/636: Performed by: NURSE ANESTHETIST, CERTIFIED REGISTERED

## 2022-04-29 PROCEDURE — 76040000019: Performed by: INTERNAL MEDICINE

## 2022-04-29 PROCEDURE — 74011000250 HC RX REV CODE- 250: Performed by: NURSE ANESTHETIST, CERTIFIED REGISTERED

## 2022-04-29 RX ORDER — NALOXONE HYDROCHLORIDE 0.4 MG/ML
0.4 INJECTION, SOLUTION INTRAMUSCULAR; INTRAVENOUS; SUBCUTANEOUS
Status: DISCONTINUED | OUTPATIENT
Start: 2022-04-29 | End: 2022-04-29 | Stop reason: HOSPADM

## 2022-04-29 RX ORDER — ATROPINE SULFATE 0.1 MG/ML
0.5 INJECTION INTRAVENOUS
Status: DISCONTINUED | OUTPATIENT
Start: 2022-04-29 | End: 2022-04-29 | Stop reason: HOSPADM

## 2022-04-29 RX ORDER — PROPOFOL 10 MG/ML
INJECTION, EMULSION INTRAVENOUS AS NEEDED
Status: DISCONTINUED | OUTPATIENT
Start: 2022-04-29 | End: 2022-04-29 | Stop reason: HOSPADM

## 2022-04-29 RX ORDER — SODIUM CHLORIDE 9 MG/ML
50 INJECTION, SOLUTION INTRAVENOUS CONTINUOUS
Status: DISCONTINUED | OUTPATIENT
Start: 2022-04-29 | End: 2022-04-29 | Stop reason: HOSPADM

## 2022-04-29 RX ORDER — FLUMAZENIL 0.1 MG/ML
0.2 INJECTION INTRAVENOUS
Status: DISCONTINUED | OUTPATIENT
Start: 2022-04-29 | End: 2022-04-29 | Stop reason: HOSPADM

## 2022-04-29 RX ORDER — EPINEPHRINE 0.1 MG/ML
1 INJECTION INTRACARDIAC; INTRAVENOUS
Status: DISCONTINUED | OUTPATIENT
Start: 2022-04-29 | End: 2022-04-29 | Stop reason: HOSPADM

## 2022-04-29 RX ORDER — FENTANYL CITRATE 50 UG/ML
100 INJECTION, SOLUTION INTRAMUSCULAR; INTRAVENOUS
Status: DISCONTINUED | OUTPATIENT
Start: 2022-04-29 | End: 2022-04-29 | Stop reason: HOSPADM

## 2022-04-29 RX ORDER — MIDAZOLAM HYDROCHLORIDE 1 MG/ML
.25-1 INJECTION, SOLUTION INTRAMUSCULAR; INTRAVENOUS
Status: DISCONTINUED | OUTPATIENT
Start: 2022-04-29 | End: 2022-04-29 | Stop reason: HOSPADM

## 2022-04-29 RX ORDER — SODIUM CHLORIDE 0.9 % (FLUSH) 0.9 %
5-40 SYRINGE (ML) INJECTION AS NEEDED
Status: DISCONTINUED | OUTPATIENT
Start: 2022-04-29 | End: 2022-04-29 | Stop reason: HOSPADM

## 2022-04-29 RX ORDER — LIDOCAINE HYDROCHLORIDE 20 MG/ML
INJECTION, SOLUTION EPIDURAL; INFILTRATION; INTRACAUDAL; PERINEURAL AS NEEDED
Status: DISCONTINUED | OUTPATIENT
Start: 2022-04-29 | End: 2022-04-29 | Stop reason: HOSPADM

## 2022-04-29 RX ORDER — SODIUM CHLORIDE 0.9 % (FLUSH) 0.9 %
5-40 SYRINGE (ML) INJECTION EVERY 8 HOURS
Status: DISCONTINUED | OUTPATIENT
Start: 2022-04-29 | End: 2022-04-29 | Stop reason: HOSPADM

## 2022-04-29 RX ORDER — DEXTROMETHORPHAN/PSEUDOEPHED 2.5-7.5/.8
1.2 DROPS ORAL
Status: DISCONTINUED | OUTPATIENT
Start: 2022-04-29 | End: 2022-04-29 | Stop reason: HOSPADM

## 2022-04-29 RX ORDER — SODIUM CHLORIDE 9 MG/ML
INJECTION, SOLUTION INTRAVENOUS
Status: DISCONTINUED | OUTPATIENT
Start: 2022-04-29 | End: 2022-04-29 | Stop reason: HOSPADM

## 2022-04-29 RX ADMIN — PROPOFOL 25 MG: 10 INJECTION, EMULSION INTRAVENOUS at 14:58

## 2022-04-29 RX ADMIN — PROPOFOL 25 MG: 10 INJECTION, EMULSION INTRAVENOUS at 15:00

## 2022-04-29 RX ADMIN — SODIUM CHLORIDE: 900 INJECTION, SOLUTION INTRAVENOUS at 14:30

## 2022-04-29 RX ADMIN — LIDOCAINE HYDROCHLORIDE 60 MG: 20 INJECTION, SOLUTION EPIDURAL; INFILTRATION; INTRACAUDAL; PERINEURAL at 14:49

## 2022-04-29 RX ADMIN — PROPOFOL 25 MG: 10 INJECTION, EMULSION INTRAVENOUS at 15:07

## 2022-04-29 RX ADMIN — PROPOFOL 25 MG: 10 INJECTION, EMULSION INTRAVENOUS at 15:01

## 2022-04-29 RX ADMIN — PROPOFOL 25 MG: 10 INJECTION, EMULSION INTRAVENOUS at 14:52

## 2022-04-29 RX ADMIN — PROPOFOL 25 MG: 10 INJECTION, EMULSION INTRAVENOUS at 14:51

## 2022-04-29 RX ADMIN — PROPOFOL 25 MG: 10 INJECTION, EMULSION INTRAVENOUS at 15:06

## 2022-04-29 RX ADMIN — PROPOFOL 25 MG: 10 INJECTION, EMULSION INTRAVENOUS at 14:54

## 2022-04-29 RX ADMIN — PROPOFOL 25 MG: 10 INJECTION, EMULSION INTRAVENOUS at 14:56

## 2022-04-29 RX ADMIN — PROPOFOL 50 MG: 10 INJECTION, EMULSION INTRAVENOUS at 14:49

## 2022-04-29 RX ADMIN — PROPOFOL 25 MG: 10 INJECTION, EMULSION INTRAVENOUS at 15:03

## 2022-04-29 NOTE — ANESTHESIA PREPROCEDURE EVALUATION
Relevant Problems   No relevant active problems       Anesthetic History   No history of anesthetic complications            Review of Systems / Medical History  Patient summary reviewed, nursing notes reviewed and pertinent labs reviewed    Pulmonary  Within defined limits                 Neuro/Psych   Within defined limits           Cardiovascular  Within defined limits                     GI/Hepatic/Renal     GERD: well controlled           Endo/Other    Diabetes: well controlled, type 2         Other Findings            Physical Exam    Airway  Mallampati: I  TM Distance: 4 - 6 cm  Neck ROM: normal range of motion   Mouth opening: Normal     Cardiovascular  Regular rate and rhythm,  S1 and S2 normal,  no murmur, click, rub, or gallop             Dental  No notable dental hx       Pulmonary  Breath sounds clear to auscultation               Abdominal  GI exam deferred       Other Findings            Anesthetic Plan    ASA: 3  Anesthesia type: MAC          Induction: Intravenous  Anesthetic plan and risks discussed with: Patient

## 2022-04-29 NOTE — ANESTHESIA POSTPROCEDURE EVALUATION
Post-Anesthesia Evaluation and Assessment    Patient: Kristina Monet MRN: [id number]  SSN: [id number]    YOB: 1955  Age: 73 y.o. Sex: female      I have evaluated the patient and they are stable and ready for discharge from the PACU. Cardiovascular Function/Vital Signs  Visit Vitals  BP (!) 100/55   Pulse 61   Temp 36.4 °C (97.5 °F)   Resp 15   Ht 5' 3\" (1.6 m)   Wt 46 kg (101 lb 8 oz)   SpO2 98%   Breastfeeding No   BMI 17.98 kg/m²       Patient is status post MAC anesthesia for Procedure(s):  ESOPHAGOGASTRODUODENOSCOPY, BRAVO CLIP  .  ESOPHAGEAL DILATION. Nausea/Vomiting: None    Postoperative hydration reviewed and adequate. Pain:  Pain Scale 1: Visual (04/29/22 1517)  Pain Intensity 1: 0 (04/29/22 1517)   Managed    Neurological Status: At baseline    Mental Status, Level of Consciousness: Alert and  oriented to person, place, and time    Pulmonary Status:   O2 Device: None (Room air) (04/29/22 1517)   Adequate oxygenation and airway patent    Complications related to anesthesia: None    Post-anesthesia assessment completed. No concerns    Signed By: Mariah Gilbert MD     April 29, 2022              Procedure(s):  ESOPHAGOGASTRODUODENOSCOPY, BRAVO CLIP  .  ESOPHAGEAL DILATION. MAC    <[email or web address]>    INITIAL Post-op Vital signs:   Vitals Value Taken Time   /66 04/29/22 1530   Temp 36.4 °C (97.5 °F) 04/29/22 1517   Pulse 59 04/29/22 1535   Resp 12 04/29/22 1535   SpO2 100 % 04/29/22 1535   Vitals shown include unvalidated device data.

## 2022-04-29 NOTE — PROGRESS NOTES
CRE balloon dilatation of the esophagus   15 mm Balloon inflated to 3 ATMs and held for 3 seconds. 16.5 mm Balloon inflated to 4.5 ATMs and held for 3 seconds. 18 mm Balloon inflated to 7 ATMs and held for 60 seconds. No subcutaneous crepitus of the chest or cervical region was noted post dilatation.

## 2022-04-29 NOTE — PROGRESS NOTES

## 2022-04-29 NOTE — OP NOTES
[address]  [address] [place]  [place], [address]  [phone]                            NAME:  Veda Enriquez   :   1955   MRN:   [id number]     Date/Time:  2022 3:11 PM    Esophagogastroduodenoscopy (EGD) Procedure Note    :  Chaim Cosby MD    Staff: Endoscopy Technician-1: Freada Apgar Endoscopy RN-1: Lindsay Paget, RN    Referring Provider:  Lila Rodriguez MD    Anethesia/Sedation:  MAC anesthesia    Procedure Details   After infomed consent was obtained for the procedure, with all risks and benefits of procedure explained the patient was taken to the endoscopy suite and placed in the left lateral decubitus position. Following sequential administration of sedation as per above, the gastroscope was inserted into the mouth and advanced under direct vision to second portion of the duodenum. A careful inspection was made as the gastroscope was withdrawn, including a retroflexed view of the proximal stomach; findings and interventions are described below. Findings:  Esophagus:normal esophageal mucosa, GE junction 40 cm from the incisors, empirically dilated with 18 mm balloon dilator, bravo clip deployed 6 cm above GE junction   Stomach:normal   Duodenum/jejunum:normal    Interventions:  Bravo and dilation            Specimens Removed:  * No specimens in log *    Complications: None. EBL:  Minimal     Impression:    See Postoperative diagnosis above    Recommendations:   - Resume normal medications. Await bravo results.      Discharge disposition:  Home in the company of  when able to ambulate    Chaim Cosby MD
36.9

## 2022-04-29 NOTE — H&P
[address]  [address] [place]  [place], [address]  [phone]                                History and Physical     NAME: Starr Gomez   :  1955   MRN:  [id number]     HPI:  The patient was seen and examined. Past Surgical History:   Procedure Laterality Date    HX CATARACT REMOVAL Bilateral     HX COLONOSCOPY  2014    [place] q5y    HX DILATION AND CURETTAGE      done with laparoscopy d/t painful menstrual cycles    HX GI      EGD    HX GYN      epiziotomy with birth    HX HEENT      wisdom teeth    HX HEENT      oral surgery - 2 teeth removed    LAPAROSCOPY ABDOMEN DIAGNOSTIC      AR ABDOMEN SURGERY PROC UNLISTED       Past Medical History:   Diagnosis Date    Aneurysm of heart NEC     atrial septum    Anxiety     Cataract     left - removed    Chronic pain     due to dystonia    Chronic pain     tendonitis or bursitis of buttock - left side    Depression     Diabetes in pregnancy     Dystonia     cranial    GERD (gastroesophageal reflux disease)     Osteoporosis 2014    Other ill-defined conditions(799.89)     hyperreactive airways    Rosacea     Pt states another physician did not believe she had this, but was symptoms of dystonia.  Umbilical discharge 10/14/7911     Social History     Tobacco Use    Smoking status: Never Smoker    Smokeless tobacco: Never Used   Vaping Use    Vaping Use: Never used   Substance Use Topics    Alcohol use: No     Alcohol/week: 0.0 standard drinks    Drug use: Never     Allergies   Allergen Reactions    Diclofenac Sodium Hives, Nausea and Vomiting and Other (comments)     Patient states she does not get hives.  Naproxen Nausea and Vomiting    Promethazine Other (comments)     Cannot taking d/t having cranial dystonia.     Advair Diskus [Fluticasone Propion-Salmeterol] Other (comments)     Agitation/jittery    Asa-Acetaminophen-Caff-Potass Nausea and Vomiting    Aspirin Nausea and Vomiting    Cherry Cough     Scratchy throat/itchy lips.  Codeine Nausea and Vomiting    Compazine [Prochlorperazine] Other (comments)     Contraindicated d/t having dystonia.  Flonase [Fluticasone] Swelling     Swelling of lips and tongue.  Ibuprofen Nausea and Vomiting    Insect Venom Other (comments)     Insect stings including beestings causes a \"big local reaction\". Was prescribed an epipen.  Melon Cough     Itchy lips/scratchy throat.  Nsaids (Non-Steroidal Anti-Inflammatory Drug) Nausea and Vomiting    Other Medication Other (comments)     Cannot take neuroleptics, trazodone, phenergan or compazine due to dystonia. Cannot take blood thinners due to atrial septal defect.  Other Plant, Animal, Environmental Other (comments)     Mold/pollen/seasonal allergies.  Sulfa (Sulfonamide Antibiotics) Other (comments)     GI Distress     Family History   Problem Relation Age of Onset    Stroke Mother     Heart Disease Mother     Heart Attack Mother         ?  Osteoporosis Mother     Anxiety Mother     Depression Mother     Hypertension Mother     Cancer Father         thyroid    Stroke Father     Kidney Disease Father         stones    Osteoporosis Father     Hypertension Sister     Osteoporosis Sister     Alcohol abuse Brother     Seizures Brother         d/t alcohol abuse    Heart Disease Brother         cardiac complication following alcohol withdrawal seizure    Hypertension Maternal Grandmother     Kidney Disease Maternal Grandmother         kidney failure due to HTN    Stroke Maternal Grandfather     Diabetes Paternal Grandfather     Parkinson's Disease Paternal Grandfather      No current facility-administered medications for this encounter. PHYSICAL EXAM:  General: WD, WN. Alert, cooperative, no acute distress    HEENT: NC, Atraumatic. PERRLA, EOMI. Anicteric sclerae. Lungs:  CTA Bilaterally. No Wheezing/Rhonchi/Rales.   Heart:  Regular rhythm,  No murmur, No Rubs, No Gallops  Abdomen: Soft, Non distended, Non tender. +Bowel sounds, no HSM  Extremities: No c/c/e  Neurologic:  CN 2-12 gi, Alert and oriented X 3. No acute neurological distress   Psych:   Good insight. Not anxious nor agitated. The heart, lungs and mental status were satisfactory for the administration of MAC sedation and for the procedure. Mallampati score: 2     The patient was counseled at length about the risks of linh Covid-19 in the wendie-operative and post-operative states including the recovery window of their procedure. The patient was made aware that linh Covid-19 after a surgical procedure may worsen their prognosis for recovering from the virus and lend to a higher morbidity and or mortality risk. The patient was given the options of postponing their procedure. All of the risks, benefits, and alternatives were discussed. The patient does wish to proceed with the procedure.       Assessment:   · gerd    Plan:   · Endoscopic procedure :egd  · MAC sedation

## 2022-04-29 NOTE — DISCHARGE INSTRUCTIONS
Kanika White [address]  [address]  [id number]  1955    It was my pleasure seeing you for your procedure. You will also receive a summary report with the findings from this procedure and any further recommendations. If you had polyps removed or biopsies taken during your procedure, you will receive a separate letter from me within the next 2 weeks. If you don't receive this letter or if you have any questions, please call my office [phone]. Please take note of the post procedure instructions listed below. Noam Amado,    Dr. Maria Fernanda aSlvador    These instructions give you information on caring for yourself after your procedure. Call your doctor if you have any problems or questions after your procedure. HOME CARE  · Walk if you have belly cramping or gas. Walking will help get rid of the air and reduce the bloated feeling in your belly (abdomen). · Your IV site (where you received drugs) may be tender to touch. Place warm towels on the site; keep your arm up on two pillows if you have any swelling or soreness in the area. · You may shower. ACTIVITY:  · Take frequent rest periods and move at a slower pace for the next 24 hours. .  · You may resume your regular activity tomorrow if you are feeling back to normal.  · Do not drive or ride a bicycle for at least 24 hours (because of the medicine (anesthesia) used during the test). · Do not sign any important legal documents or use or operate any machinery for 24 hours  · Do not take sleeping medicines/nerve drugs for 24 hours unless the doctor tells you. · You can return to work/school tomorrow unless otherwise instructed. NUTRITION:  · Drink plenty of fluids to keep your pee (urine) clear or pale yellow  · Begin with a light meal and progress to your normal diet.  Heavy or fried foods are harder to digest and may make you feel sick to your stomach (nauseated). · Once you are feeling back to normal, you may resume your normal diet as instructed by your doctor. · Avoid alcoholic beverages for 24 hours or as instructed. IF YOU HAD BIOPSIES TAKEN OR POLYPS REMOVED DURING THE PROCEDURE:  · For the next 7 days, avoid all non-steroidal antiinflammatory medications such as Ibuprofen, Motrin, Advil, Alleve, Callie-seltzer, Goody's powder, BC powder. · If you do not have an heart condition that requires you to take a daily aspirin, you should avoid taking aspirin for 7 days. · Eat a soft diet for 24 hours. · Monitor your stools for any blood or dark black, tar-like, stools as this may be a sign of bleeding and if you see any blood, notify your doctor immediately. GET HELP RIGHT AWAY AND SEEK IMMEDIATE MEDICAL CARE IF:  · You have more than a spotting of blood in your stool. · You pass clumps of tissue (blood clots) or fill the toilet with blood. · Your belly is painfully swollen or puffy (abdominal distention). · You throw up (vomit). · You have a fever. · You have redness, pain or swelling at the IV site that last greater than two days. · You have abdominal pain or discomfort that is severe or gets worse throughout the day. Post-procedure diagnosis:  1. - None    Post-procedure recommendations:    Findings:  Esophagus:normal esophageal mucosa, GE junction 40 cm from the incisors, empirically dilated with 18 mm balloon dilator, bravo clip deployed 6 cm above GE junction   Stomach:normal   Duodenum/jejunum:normal    Interventions:  Bravo and dilation     Recommendations:   - Resume normal medications. Await bravo results. Patient Education        Esophageal Dilation: What to Expect at [address] had an esophageal dilation. This procedure can open up narrow areas of the esophagus. After the procedure, you will stay at the hospital or surgery center for 1 to 2 hours.  This will allow the medicine to wear off. You will be able to go home after your doctor or nurse checks to make sure that you're not having any problems. This care sheet gives you a general idea about how long it will take for you to recover. But each person recovers at a different pace. Follow the steps below to get better as quickly as possible. How can you care for yourself at home? Activity    · Rest as much as you need to after you go home.     · You should be able to go back to your usual activities the day after the procedure. Diet    · Follow your doctor's directions for eating after the procedure.     · Drink plenty of fluids (unless your doctor has told you not to). Medicines    · Your doctor will tell you if and when you can restart your medicines. He or she will also give you instructions about taking any new medicines.     · If you take aspirin or some other blood thinner, ask your doctor if and when to start taking it again. Make sure that you understand exactly what your doctor wants you to do.     · If you have a sore throat the day after the procedure, use an over-the-counter spray to numb your throat. Sucking on throat lozenges and gargling with warm salt water may also help relieve your symptoms. Follow-up care is a key part of your treatment and safety. Be sure to make and go to all appointments, and call your doctor if you are having problems. It's also a good idea to know your test results and keep a list of the medicines you take. When should you call for help? Call 911 anytime you think you may need emergency care.  For example, call if:    · You passed out (lost consciousness).     · You have trouble breathing.     · Your stools are maroon or very bloody   Call your doctor now or seek immediate medical care if:    · You have new or worse belly pain.     · You have a fever.     · You have new or more blood in your stools.     · You are sick to your stomach and cannot drink fluids.     · You cannot pass stools or gas.     · You have pain that does not get better after you take pain medicine. Watch closely for changes in your health, and be sure to contact your doctor if:    · Your throat still hurts after a day or two.     · You do not get better as expected. Where can you learn more? Go to http://www.gray.com/  Enter J014 in the search box to learn more about \"Esophageal Dilation: What to Expect at Home. \"  Current as of: September 8, 2021               Content Version: 13.2  © 6060-1667 [employer]. Care instructions adapted under license by [employer] (which disclaims liability or warranty for this information). If you have questions about a medical condition or this instruction, always ask your healthcare professional. [address] any warranty or liability for your use of this information. Learning About Coronavirus [phone])  Coronavirus [phone]): Overview  What is coronavirus (COVID-19)? The coronavirus disease (COVID-19) is caused by a virus. It is an illness that was first found in [place], [place], in December 2019. It has since spread worldwide. The virus can cause fever, cough, and trouble breathing. In severe cases, it can cause pneumonia and make it hard to breathe without help. It can cause death. Coronaviruses are a large group of viruses. They cause the common cold. They also cause more serious illnesses like Middle East respiratory syndrome (MERS) and severe acute respiratory syndrome (SARS). COVID-19 is caused by a novel coronavirus. That means it's a new type that has not been seen in people before. This virus spreads person-to-person through droplets from coughing and sneezing. It can also spread when you are close to someone who is infected. And it can spread when you touch something that has the virus on it, such as a doorknob or a tabletop.   What can you do to protect yourself from coronavirus (COVID-19)? The best way to protect yourself from getting sick is to:  · Avoid areas where there is an outbreak. · Avoid contact with people who may be infected. · Wash your hands often with soap or alcohol-based hand sanitizers. · Avoid crowds and try to stay at least 6 feet away from other people. · Wash your hands often, especially after you cough or sneeze. Use soap and water, and scrub for at least 20 seconds. If soap and water aren't available, use an alcohol-based hand . · Avoid touching your mouth, nose, and eyes. What can you do to avoid spreading the virus to others? To help avoid spreading the virus to others:  · Cover your mouth with a tissue when you cough or sneeze. Then throw the tissue in the trash. · Use a disinfectant to clean things that you touch often. · Stay home if you are sick or have been exposed to the virus. Don't go to school, work, or public areas. And don't use public transportation. · If you are sick:  ? Leave your home only if you need to get medical care. But call the doctor's office first so they know you're coming. And wear a face mask, if you have one.  ? If you have a face mask, wear it whenever you're around other people. It can help stop the spread of the virus when you cough or sneeze. ? Clean and disinfect your home every day. Use household  and disinfectant wipes or sprays. Take special care to clean things that you grab with your hands. These include doorknobs, remote controls, phones, and handles on your refrigerator and microwave. And don't forget countertops, tabletops, bathrooms, and computer keyboards. When to call for help  Call 911 anytime you think you may need emergency care. For example, call if:  · You have severe trouble breathing. (You can't talk at all.)  · You have constant chest pain or pressure. · You are severely dizzy or lightheaded. · You are confused or can't think clearly. · Your face and lips have a blue color.   · You pass out (lose consciousness) or are very hard to wake up. Call your doctor now if you develop symptoms such as:  · Shortness of breath. · Fever. · Cough. If you need to get care, call ahead to the doctor's office for instructions before you go. Make sure you wear a face mask, if you have one, to prevent exposing other people to the virus. Where can you get the latest information? The following health organizations are tracking and studying this virus. Their websites contain the most up-to-date information. Katerine Mcclendon also learn what to do if you think you may have been exposed to the virus. · U.S. Centers for Disease Control and Prevention (CDC): The CDC provides updated news about the disease and travel advice. The website also tells you how to prevent the spread of infection. www.cdc.gov  · World Health Organization [facility]): WHO offers information about the virus outbreaks. WHO also has travel advice. www.who.int  Current as of: April 1, 2020               Content Version: 12.4  © 2006-2020 [employer]. Care instructions adapted under license by your healthcare professional. If you have questions about a medical condition or this instruction, always ask your healthcare professional. [address] any warranty or liability for your use of this information.

## 2022-06-28 ENCOUNTER — TELEPHONE (OUTPATIENT)
Dept: INTERNAL MEDICINE CLINIC | Age: 67
End: 2022-06-28

## 2022-06-28 RX ORDER — ONDANSETRON 8 MG/1
8 TABLET, ORALLY DISINTEGRATING ORAL
Qty: 20 TABLET | Refills: 0 | Status: SHIPPED | OUTPATIENT
Start: 2022-06-28

## 2022-06-29 NOTE — TELEPHONE ENCOUNTER
----- Message from Vicky Vazquez sent at 6/28/2022  5:46 PM EDT -----  Regarding: FW: Migraine or not?    ----- Message -----  From: Jaquan Lowery  Sent: 6/28/2022   5:31 PM EDT  To: Danya Méndez Pool  Subject: Migraine or not? I am coming to see you Thursday. FYI:  I am having a similar episode late this afternoon. More vomiting than a week ago. Slight headache. No aura. Feel pretty sick.

## 2022-06-30 ENCOUNTER — OFFICE VISIT (OUTPATIENT)
Dept: INTERNAL MEDICINE CLINIC | Age: 67
End: 2022-06-30
Payer: COMMERCIAL

## 2022-06-30 VITALS
HEART RATE: 84 BPM | HEIGHT: 63 IN | RESPIRATION RATE: 16 BRPM | DIASTOLIC BLOOD PRESSURE: 57 MMHG | BODY MASS INDEX: 18.25 KG/M2 | OXYGEN SATURATION: 97 % | WEIGHT: 103 LBS | SYSTOLIC BLOOD PRESSURE: 135 MMHG | TEMPERATURE: 98.6 F

## 2022-06-30 DIAGNOSIS — G24.8 CRANIOFACIAL DYSTONIA: ICD-10-CM

## 2022-06-30 DIAGNOSIS — R51.9 NEW ONSET HEADACHE: Primary | ICD-10-CM

## 2022-06-30 DIAGNOSIS — G24.3 CERVICAL DYSTONIA: ICD-10-CM

## 2022-06-30 PROCEDURE — 99213 OFFICE O/P EST LOW 20 MIN: CPT | Performed by: INTERNAL MEDICINE

## 2022-06-30 NOTE — PROGRESS NOTES
HISTORY OF PRESENT ILLNESS  Kehinde Goins is a 73 y.o. female. HPI  Starting on 6/21 she has had 2 episodes of nausea, vomiting followed by a headache. Started with severe hunder (had eaten breakfast 1-2 hours prior) which turned to nausea and then vomiting  Felt very weak and \"systemically sick\". Lasted approx 1 hour. Had a mild headache after the event. Felt like a mild headache/nausea with migraines. Mentioned to her ENT who told her to call her PCP. This Tuesday midmorning went to take her walk (wearing hat, sunglasses and brings water). Walked her usual 5 miles. After eating lunch around 3-4 had similar episode but more vomiting. Both episodes felt back to normal in 1 year. Both episodes without aura. This month has had visual aura but no headache x2 - very rare last 13 years. .       She has had migraines in the past but usually visual aura followed by unilateral headache. Was associated with onset of menses. Headaches mostly resolved in 2009 with menopause. Current Outpatient Medications   Medication Instructions    acetaminophen (TYLENOL) 650 mg, Oral, EVERY 4-6 HOURS PRN    baclofen (LIORESAL) 5 mg, Oral, 3 TIMES DAILY, 1/2 tablet by mouth three times daily    botulinum toxin type A (BOTOX) 100 unit injection IntraMUSCular, ONCE, 75 units four times a year for dystonia.  calcium citrate/vitamin D3 (CITRACAL + D PO) 2 [place], Oral, DAILY, 400mg/500IU per 2 pills     cholecalciferol (VITAMIN D3) 1,000 Units, Oral    EPINEPHrine (EPIPEN) 0.3 mg, IntraMUSCular, ONCE PRN, For up to 1 dose    gabapentin (NEURONTIN) 100 mg, 3 TIMES DAILY    Lactobacillus acidophilus (ACIDOPHILUS PO) 1 Tablet, Oral, DAILY, 5mg of lactobacillus     ondansetron (ZOFRAN ODT) 8 mg, Oral, EVERY 8 HOURS AS NEEDED    OTHER Reflux Gourmet dietary supplement. Syrup. Has Sodium Alginate. Take 1 teaspoon 2-3 times a day.       Visit Vitals  BP (!) 135/57 (BP 1 Location: Right upper arm, BP Patient Position: Sitting, BP Cuff Size: Adult)   Pulse 84   Temp 98.6 °F (37 °C) (Temporal)   Resp 16   Ht 5' 3\" (1.6 m)   Wt 103 lb (46.7 kg)   SpO2 97%   BMI 18.25 kg/m²       ROS  See [id number]  Physical Exam  Vitals reviewed. Constitutional:       Appearance: Normal appearance. HENT:      Head: Normocephalic and atraumatic. Right Ear: Tympanic membrane, ear canal and external ear normal.      Left Ear: Tympanic membrane, ear canal and external ear normal.      Nose: Nose normal.      Mouth/Throat:      Mouth: Mucous membranes are moist.      Pharynx: Oropharynx is clear. Eyes:      Extraocular Movements: Extraocular movements intact. Conjunctiva/sclera: Conjunctivae normal.      Pupils: Pupils are equal, round, and reactive to light. Comments: fundiscopic exam nml except floater right eye   Cardiovascular:      Rate and Rhythm: Normal rate and regular rhythm. Pulses: Normal pulses. Heart sounds: Normal heart sounds. Pulmonary:      Effort: Pulmonary effort is normal.      Breath sounds: Normal breath sounds. Abdominal:      General: Bowel sounds are normal. There is no distension. Palpations: Abdomen is soft. There is no mass. Tenderness: There is no abdominal tenderness. Musculoskeletal:      Cervical back: Normal range of motion and neck supple. Right lower leg: No edema. Left lower leg: No edema. Neurological:      General: No focal deficit present. Mental Status: She is alert and oriented to person, place, and time. Cranial Nerves: No cranial nerve deficit. Motor: No weakness. Coordination: Coordination normal.         ASSESSMENT and PLAN  Diagnoses and all orders for this visit:    1. New onset headache  -     MRI BRAIN W WO CONT; Future    2. Cervical/craniofascial  dystonia  -     MRI BRAIN W WO CONT; Future    Encouraged trial of Odansetron for nausea.

## 2022-06-30 NOTE — PROGRESS NOTES
Chief Complaint   Patient presents with    Migraine     Patient is here today for possible Migraine. Patient has a hx of Migraine. Patient reports she gets visual Aura. Patient states last week she did not get an Aura, she started to feel very hungry and with 45 min passing she felt nauseous, weak, and vomiting. Pt took a Benadryl to help with sx. Patient been for a 5 mile walk, and had water with her and she wore a hat. Patient not sure if sx is related from exertion. Vitals:    06/30/22 1155   BP: (!) 135/57   Pulse: 84   Resp: 16   Temp: 98.6 °F (37 °C)   TempSrc: Temporal   SpO2: 97%   Weight: 103 lb (46.7 kg)   Height: 5' 3\" (1.6 m)   PainSc:   1       There are no preventive care reminders to display for this patient. 1. \"Have you been to the ER, urgent care clinic since your last visit? Hospitalized since your last visit? \" No    2. \"Have you seen or consulted any other health care providers outside of the [address] [place] [address] since your last visit? \" No     3. For patients aged 39-70: Has the patient had a colonoscopy / FIT/ Cologuard? Yes - no Care Gap present      If the patient is female:    4. For patients aged 41-77: Has the patient had a mammogram within the past 2 years? Yes - no Care Gap present      5. For patients aged 21-65: Has the patient had a pap smear?  Yes - no Care Gap present

## 2022-07-28 ENCOUNTER — TELEPHONE (OUTPATIENT)
Dept: INTERNAL MEDICINE CLINIC | Age: 67
End: 2022-07-28

## 2022-07-28 DIAGNOSIS — R13.19 ESOPHAGEAL DYSPHAGIA: Primary | ICD-10-CM

## 2022-07-28 DIAGNOSIS — K22.4 ESOPHAGEAL DYSMOTILITY: ICD-10-CM

## 2022-07-29 NOTE — TELEPHONE ENCOUNTER
Regarding: Xray of chest and abdomen  ----- Message from Tabitha Hayward LPN sent at 4/34/2532  2:16 PM EDT -----  Pt needs order for x-ray for chest and abdomen b/f she can get her MRI.     ----- Message from Kalin Orlando to Francheska George MD sent at 7/28/2022 12:17 PM -----   Concerning scheduling the MRI of my brain    1. I wanted to tell my neurologist at [facility] about the migrainous episodes. He agrees with you that it would be good to get an MRI of my brain to rule out other possibilities. 2.  When I called to schedule the MRI (at [facility]), I asked about confirming via x-ray that I had passed the Bravo clip that was inserted on 4/29. The MRI tech was consulted and said I should get an xray of my chest and abdomen to confirm the clip is gone. I am to do this before I schedule the MRI. The  said I should ask you for a req to get an xray of my chest and abdomen.     Thanks,    [facility]

## 2022-08-03 ENCOUNTER — HOSPITAL ENCOUNTER (OUTPATIENT)
Dept: GENERAL RADIOLOGY | Age: 67
Discharge: HOME | End: 2022-08-03
Payer: COMMERCIAL

## 2022-08-03 DIAGNOSIS — K22.4 ESOPHAGEAL DYSMOTILITY: ICD-10-CM

## 2022-08-03 DIAGNOSIS — R13.19 ESOPHAGEAL DYSPHAGIA: ICD-10-CM

## 2022-08-03 PROCEDURE — 71045 X-RAY EXAM CHEST 1 VIEW: CPT

## 2022-08-03 PROCEDURE — 74018 RADEX ABDOMEN 1 VIEW: CPT

## 2022-08-26 ENCOUNTER — HOSPITAL ENCOUNTER (OUTPATIENT)
Dept: MRI IMAGING | Age: 67
Discharge: HOME | End: 2022-08-26
Attending: INTERNAL MEDICINE
Payer: COMMERCIAL

## 2022-08-26 DIAGNOSIS — R51.9 NEW ONSET HEADACHE: ICD-10-CM

## 2022-08-26 DIAGNOSIS — G24.3 CERVICAL DYSTONIA: ICD-10-CM

## 2022-08-26 PROCEDURE — 70553 MRI BRAIN STEM W/O & W/DYE: CPT

## 2022-08-26 PROCEDURE — A9576 INJ PROHANCE MULTIPACK: HCPCS

## 2022-08-26 PROCEDURE — 74011250636 HC RX REV CODE- 250/636

## 2022-08-26 RX ADMIN — GADOTERIDOL 10 ML: 279.3 INJECTION, SOLUTION INTRAVENOUS at 14:59

## 2023-01-15 NOTE — PROGRESS NOTES
Subjective:   78 y.o. female for Well Woman Check. Her gyne and breast care is done elsewhere by her Ob-Gyne physician. 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      pneumococcal 20-angélica conj-dip, PF, (Prevnar 20, PF,) 0.5 mL syrg injection 0.5 mL by IntraMUSCular route PRIOR TO DISCHARGE for 1 dose. 1 Each 0    Lactobacillus acidophilus (ACIDOPHILUS PO) Take 1 Tablet by mouth daily. 5mg of lactobacillus      OTHER Reflux Gourmet dietary supplement. Syrup. Has Sodium Alginate. Take 1 teaspoon 2-3 times a day. acetaminophen (TYLENOL) 325 mg tablet Take 650 mg by mouth every four to six (4-6) hours as needed for Pain. baclofen (LIORESAL) 10 mg tablet Take 5 mg by mouth three (3) times daily. 1/2 tablet by mouth three times daily  11    onabotulinumtoxinA (BOTOX) 100 unit injection by IntraMUSCular route once. 75 units four times a year for dystonia. cholecalciferol (VITAMIN D3) 25 mcg (1,000 unit) cap Take 1,000 Units by mouth.      gabapentin (NEURONTIN) 100 mg capsule Take 100 mg by mouth three (3) times daily. EPINEPHrine (EPIPEN) 0.3 mg/0.3 mL injection 0.3 mg by IntraMUSCular route once as needed. For up to 1 dose (Patient not taking: Reported on 1/16/2023)       Allergies   Allergen Reactions    Diclofenac Sodium Hives, Nausea and Vomiting and Other (comments)     Patient states she does not get hives.     Naproxen Nausea and Vomiting    Promethazine Other (comments)     Cannot taking d/t having cranial dystonia. Advair Diskus [Fluticasone Propion-Salmeterol] Other (comments)     Agitation/jittery    Asa-Acetaminophen-Caff-Potass Nausea and Vomiting    Aspirin Nausea and Vomiting    Cherry Cough     Scratchy throat/itchy lips. Codeine Nausea and Vomiting    Compazine [Prochlorperazine] Other (comments)     Contraindicated d/t having dystonia. Flonase [Fluticasone] Swelling     Swelling of lips and tongue. Ibuprofen Nausea and Vomiting    Insect Venom Other (comments)     Insect stings including beestings causes a \"big local reaction\". Was prescribed an epipen. Melon Cough     Itchy lips/scratchy throat. Nsaids (Non-Steroidal Anti-Inflammatory Drug) Nausea and Vomiting    Other Medication Other (comments)     Cannot take neuroleptics, trazodone, phenergan or compazine due to dystonia. Cannot take blood thinners due to atrial septal defect. Other Plant, Animal, Environmental Other (comments)     Mold/pollen/seasonal allergies. Sulfa (Sulfonamide Antibiotics) Other (comments)     GI Distress     Past Medical History:   Diagnosis Date    Aneurysm of heart NEC     atrial septum    Anxiety     Cataract     left - removed    Chronic pain     due to dystonia    Chronic pain     tendonitis or bursitis of buttock - left side    Depression     Diabetes in pregnancy 1997    Dystonia     cranial    GERD (gastroesophageal reflux disease)     Headache 1977? Hypercholesterolemia     I cant remember when; lower now    Osteoporosis 06/04/2014    Other ill-defined conditions(799.89)     hyperreactive airways    Rosacea     Pt states another physician did not believe she had this, but was symptoms of dystonia.     Umbilical discharge 71/27/1352     Past Surgical History:   Procedure Laterality Date    HX CATARACT REMOVAL Bilateral     HX COLONOSCOPY  12/17/2014    [facility] q5y    HX DILATION AND CURETTAGE  1984    done with laparoscopy d/t painful menstrual cycles    HX ENDOSCOPY  2012; 2022    Small hiatal hernia 2012; dont have it in 2023    HX GI      EGD    HX GYN      epiziotomy with birth    HX HEENT  1978    wisdom teeth    HX HEENT  2020    oral surgery - 2 teeth removed    LAPAROSCOPY ABDOMEN DIAGNOSTIC  1984    IN UNLISTED PROCEDURE ABDOMEN PERITONEUM & OMENTUM      IN UNLISTED PROCEDURE BREAST  Dont remember    Results:  normal; lumpy breast tissue     Family History   Problem Relation Age of Onset    Stroke Mother     Heart Disease Mother     Heart Attack Mother         ?     Osteoporosis Mother     Anxiety Mother     Depression Mother     Hypertension Mother     Cancer Father         Thyroid    Stroke Father     Kidney Disease Father         stones    Osteoporosis Father     Hypertension Sister     Osteoporosis Sister     Alcohol abuse Brother     Seizures Brother         d/t alcohol abuse    Heart Disease Brother         cardiac complication following alcohol withdrawal seizure    Asthma Brother     Hypertension Maternal Grandmother     Kidney Disease Maternal Grandmother         kidney failure due to HTN    Stroke Maternal Grandfather     Hypertension Maternal Grandfather         Maternal Grandfather    Stroke Paternal Grandmother     Diabetes Paternal Grandfather     Parkinson's Disease Paternal Grandfather     Hypertension Sister      Social History     Tobacco Use    Smoking status: Never    Smokeless tobacco: Never   Substance Use Topics    Alcohol use: No        Lab Results   Component Value Date/Time    WBC 4.1 11/22/2021 09:08 AM    HGB 15.1 11/22/2021 09:08 AM    HCT 44.9 11/22/2021 09:08 AM    PLATELET 922 12/90/1416 09:08 AM    MCV 98 (H) 11/22/2021 09:08 AM     Lab Results   Component Value Date/Time    Glucose 100 (H) 11/22/2021 09:08 AM    LDL, calculated 119 (H) 11/22/2021 09:08 AM    LDL, calculated 108 (H) 03/22/2019 11:44 AM    Creatinine 0.90 11/22/2021 09:08 AM      Lab Results   Component Value Date/Time    Cholesterol, total 203 (H) 11/22/2021 09:08 AM    HDL Cholesterol 71 11/22/2021 09:08 AM    LDL, calculated 119 (H) 11/22/2021 09:08 AM    LDL, calculated 108 (H) 03/22/2019 11:44 AM    Triglyceride 73 11/22/2021 09:08 AM    CHOL/HDL Ratio 2.7 03/08/2010 10:27 AM     Lab Results   Component Value Date/Time    ALT (SGPT) 17 11/22/2021 09:08 AM    Alk. phosphatase 55 11/22/2021 09:08 AM    Bilirubin, total 0.7 11/22/2021 09:08 AM    Albumin 4.4 11/22/2021 09:08 AM    Protein, total 6.7 11/22/2021 09:08 AM    PLATELET 204 29/89/7326 09:08 AM       Lab Results   Component Value Date/Time    GFR est non-AA 67 11/22/2021 09:08 AM    GFR est AA 78 11/22/2021 09:08 AM    Creatinine 0.90 11/22/2021 09:08 AM    BUN 14 11/22/2021 09:08 AM    Sodium 140 11/22/2021 09:08 AM    Potassium 5.1 11/22/2021 09:08 AM    Chloride 102 11/22/2021 09:08 AM    CO2 28 11/22/2021 09:08 AM    PTH, Intact 28 10/31/2014 09:13 AM     Lab Results   Component Value Date/Time    TSH 2.730 11/22/2021 09:08 AM         Specific concerns today:   Episodes of nausea and vomiting have resolved. ? Migraine headaches. MRI was normal.    Would like A1C. In Nov 2021, BS was 100. Improved diet, lost weight and A1C was nml. No longer being as strict with her diet. Falling asleep when watching TV. Had been doing ballet but stopped livestreaming classes in the last month. Missed walking last month due to holidays, now back to walking 3-4 miles 5 days a week. Son struggling with depression. Review of Systems  Constitutional: negative  Eyes: negative except for contacts/glasses  Ears, nose, mouth, throat, and face: negative except for jaw pain secondary to dystonia  Respiratory: negative  Cardiovascular: negative  Gastrointestinal: negative except for occ reflux symptoms. Taking otc med prn.     Genitourinary:negative  Integument/breast: negative  Hematologic/lymphatic: negative  Musculoskeletal:negative except for intermittent finger swelling and stiffness, strained right groin in ballet  Neurological: negative except for dystonia head and neck. More issues with blepherospasm. More issues with talking and eating. Seeing neurologist.  Getting botox regularly. Behavioral/Psych: negative except for anxiety and depression - does not want to take medications at this time. Endocrine: negative  Allergic/Immunologic: negative    Objective:   Blood pressure (!) 115/53, pulse 78, temperature 98.6 °F (37 °C), temperature source Temporal, resp. rate 18, height 5' 3\" (1.6 m), weight 108 lb 3.2 oz (49.1 kg), SpO2 98 %.    Physical Examination:   General appearance - alert, well appearing, and in no distress and oriented to person, place, and time  Mental status - alert, oriented to person, place, and time, normal mood, behavior, speech, dress, motor activity, and thought processes  Eyes - pupils equal and reactive, extraocular eye movements intact  Ears - bilateral TM's and external ear canals normal  Nose - normal and patent, no erythema, discharge or polyps  Mouth - mucous membranes moist, pharynx normal without lesions  Neck - supple, no significant adenopathy, carotids upstroke normal bilaterally, no bruits, thyroid exam: thyroid is normal in size without nodules or tenderness, dystonic movement  Lymphatics - no palpable lymphadenopathy, no hepatosplenomegaly  Chest - clear to auscultation, no wheezes, rales or rhonchi, symmetric air entry  Heart - normal rate, regular rhythm, normal S1, S2, no murmurs, rubs, clicks or gallops  Abdomen - soft, nontender, nondistended, no masses or organomegaly  bowel sounds normal  Back exam - full range of motion, no tenderness, palpable spasm or pain on motion  Neurological - alert, oriented, normal speech, no focal findings or movement disorder noted - dystonia of jaw  Musculoskeletal - no joint tenderness, deformity or swelling  Extremities - peripheral pulses normal, no pedal edema, no clubbing or cyanosis  Skin - normal coloration and turgor, no rashes, no suspicious skin lesions noted     Assessment/Plan:   72yo female here for CPE  call if any problems  Diagnoses and all orders for this visit:    1. Routine general medical examination at a health care facility  -     METABOLIC PANEL, COMPREHENSIVE; Future  -     LIPID PANEL; Future  -     HEMOGLOBIN A1C WITH EAG; Future  -     CBC W/O DIFF; Future  -     TSH 3RD GENERATION; Future  -     VITAMIN D, 25 HYDROXY; Future    2. Cervical dystonia and 3. 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 Continue Botox injections. 4. Gastroesophageal reflux disease, unspecified whether esophagitis present 5. Esophageal dysmotility - intermittent secondary to above. 6. Elevated blood sugar  -     HEMOGLOBIN A1C WITH EAG; Future    Other orders  -     pneumococcal 20-angélica conj-dip, PF, (Prevnar 20, PF,) 0.5 mL syrg injection; 0.5 mL by IntraMUSCular route PRIOR TO DISCHARGE for 1 dose.

## 2023-01-16 ENCOUNTER — OFFICE VISIT (OUTPATIENT)
Dept: INTERNAL MEDICINE CLINIC | Age: 68
End: 2023-01-16
Payer: COMMERCIAL

## 2023-01-16 VITALS
WEIGHT: 108.2 LBS | HEART RATE: 78 BPM | TEMPERATURE: 98.6 F | OXYGEN SATURATION: 98 % | BODY MASS INDEX: 19.17 KG/M2 | SYSTOLIC BLOOD PRESSURE: 115 MMHG | DIASTOLIC BLOOD PRESSURE: 53 MMHG | HEIGHT: 63 IN | RESPIRATION RATE: 18 BRPM

## 2023-01-16 DIAGNOSIS — G24.3 CERVICAL DYSTONIA: ICD-10-CM

## 2023-01-16 DIAGNOSIS — K22.4 ESOPHAGEAL DYSMOTILITY: ICD-10-CM

## 2023-01-16 DIAGNOSIS — K21.9 GASTROESOPHAGEAL REFLUX DISEASE, UNSPECIFIED WHETHER ESOPHAGITIS PRESENT: ICD-10-CM

## 2023-01-16 DIAGNOSIS — R73.9 ELEVATED BLOOD SUGAR: ICD-10-CM

## 2023-01-16 DIAGNOSIS — Z00.00 ROUTINE GENERAL MEDICAL EXAMINATION AT A HEALTH CARE FACILITY: Primary | ICD-10-CM

## 2023-01-16 DIAGNOSIS — G24.8 CRANIOFACIAL DYSTONIA: ICD-10-CM

## 2023-01-16 PROCEDURE — 99397 PER PM REEVAL EST PAT 65+ YR: CPT | Performed by: INTERNAL MEDICINE

## 2023-01-16 RX ORDER — PSEUDOEPHEDRINE HCL 30 MG
TABLET ORAL
COMMUNITY

## 2023-01-16 RX ORDER — PNEUMOCOCCAL 20-VALENT CONJUGATE VACCINE 2.2; 2.2; 2.2; 2.2; 2.2; 2.2; 2.2; 2.2; 2.2; 2.2; 2.2; 2.2; 2.2; 2.2; 2.2; 2.2; 4.4; 2.2; 2.2; 2.2 UG/.5ML; UG/.5ML; UG/.5ML; UG/.5ML; UG/.5ML; UG/.5ML; UG/.5ML; UG/.5ML; UG/.5ML; UG/.5ML; UG/.5ML; UG/.5ML; UG/.5ML; UG/.5ML; UG/.5ML; UG/.5ML; UG/.5ML; UG/.5ML; UG/.5ML; UG/.5ML
0.5 INJECTION, SUSPENSION INTRAMUSCULAR
Qty: 1 EACH | Refills: 0 | Status: SHIPPED | OUTPATIENT
Start: 2023-01-16

## 2023-01-16 NOTE — PROGRESS NOTES
Chief Complaint   Patient presents with    Physical     Pt states she would like her a1c checked          Vitals:    01/16/23 0951   BP: (!) 115/53   Pulse: 78   Resp: 18   Temp: 98.6 °F (37 °C)   TempSrc: Temporal   SpO2: 98%   Weight: 108 lb 3.2 oz (49.1 kg)   Height: 5' 3\" (1.6 m)   PainSc:   4   PainLoc: Generalized       Current Outpatient Medications on File Prior to Visit   Medication Sig Dispense Refill    pseudoephedrine (SUDAFED) 30 mg tablet Take  by mouth. Lactobacillus acidophilus (ACIDOPHILUS PO) Take 1 Tablet by mouth daily. 5mg of lactobacillus      OTHER Reflux Gourmet dietary supplement. Syrup. Has Sodium Alginate. Take 1 teaspoon 2-3 times a day. acetaminophen (TYLENOL) 325 mg tablet Take 650 mg by mouth every four to six (4-6) hours as needed for Pain. baclofen (LIORESAL) 10 mg tablet Take 5 mg by mouth three (3) times daily. 1/2 tablet by mouth three times daily  11    onabotulinumtoxinA (BOTOX) 100 unit injection by IntraMUSCular route once. 75 units four times a year for dystonia. cholecalciferol (VITAMIN D3) 25 mcg (1,000 unit) cap Take 1,000 Units by mouth.      gabapentin (NEURONTIN) 100 mg capsule Take 100 mg by mouth three (3) times daily. ondansetron (ZOFRAN ODT) 8 mg disintegrating tablet Take 1 Tablet by mouth every eight (8) hours as needed for Nausea or Vomiting. (Patient not taking: No sig reported) 20 Tablet 0    calcium citrate/vitamin D3 (CITRACAL + D PO) Take 2 Caplet by mouth daily. 400mg/500IU per 2 pills (Patient not taking: Reported on 1/16/2023)      EPINEPHrine (EPIPEN) 0.3 mg/0.3 mL injection 0.3 mg by IntraMUSCular route once as needed. For up to 1 dose (Patient not taking: Reported on 1/16/2023)       No current facility-administered medications on file prior to visit. There are no preventive care reminders to display for this patient. 1. \"Have you been to the ER, urgent care clinic since your last visit?   Hospitalized since your last visit? \" No    2. \"Have you seen or consulted any other health care providers outside of the [address] [place] [address] since your last visit? \" Yes ENT, Neurologist, GYN      3. For patients aged 39-70: Has the patient had a colonoscopy / FIT/ Cologuard? Yes - no Care Gap present      If the patient is female:    4. For patients aged 41-77: Has the patient had a mammogram within the past 2 years? Yes - no Care Gap present      5. For patients aged 21-65: Has the patient had a pap smear?  Yes - no Care Gap present

## 2023-10-23 ENCOUNTER — TELEPHONE (OUTPATIENT)
Age: 68
End: 2023-10-23

## 2023-11-27 NOTE — TELEPHONE ENCOUNTER
----- Message from Melina Benavides [place] sent at 10/23/2023  3:32 PM EDT -----  Regarding: FW: Please call  Contact: [phone]    ----- Message -----  From: Tripp Onofre  Sent: 10/23/2023   9:10 AM EDT  To: [facility] Clinical Staff  Subject: Please call                                      For Dr. Jud Gordon:  Please call me at [phone]. Declines colon cancer screening

## 2024-03-16 SDOH — HEALTH STABILITY: PHYSICAL HEALTH: ON AVERAGE, HOW MANY MINUTES DO YOU ENGAGE IN EXERCISE AT THIS LEVEL?: 60 MIN

## 2024-03-16 SDOH — HEALTH STABILITY: PHYSICAL HEALTH: ON AVERAGE, HOW MANY DAYS PER WEEK DO YOU ENGAGE IN MODERATE TO STRENUOUS EXERCISE (LIKE A BRISK WALK)?: 4 DAYS

## 2024-03-19 ENCOUNTER — OFFICE VISIT (OUTPATIENT)
Dept: PRIMARY CARE CLINIC | Facility: CLINIC | Age: 69
End: 2024-03-19
Payer: COMMERCIAL

## 2024-03-19 VITALS
RESPIRATION RATE: 12 BRPM | OXYGEN SATURATION: 98 % | HEIGHT: 63 IN | TEMPERATURE: 96.8 F | WEIGHT: 110 LBS | SYSTOLIC BLOOD PRESSURE: 109 MMHG | BODY MASS INDEX: 19.49 KG/M2 | HEART RATE: 82 BPM | DIASTOLIC BLOOD PRESSURE: 65 MMHG

## 2024-03-19 DIAGNOSIS — E78.2 MIXED HYPERLIPIDEMIA: ICD-10-CM

## 2024-03-19 DIAGNOSIS — M72.2 PLANTAR FIBROMATOSIS: ICD-10-CM

## 2024-03-19 DIAGNOSIS — Z13.1 SCREENING FOR DIABETES MELLITUS: ICD-10-CM

## 2024-03-19 DIAGNOSIS — Z00.00 WELL WOMAN EXAM (NO GYNECOLOGICAL EXAM): ICD-10-CM

## 2024-03-19 DIAGNOSIS — Z12.31 SCREENING MAMMOGRAM FOR BREAST CANCER: ICD-10-CM

## 2024-03-19 DIAGNOSIS — M81.0 AGE-RELATED OSTEOPOROSIS WITHOUT CURRENT PATHOLOGICAL FRACTURE: ICD-10-CM

## 2024-03-19 DIAGNOSIS — G24.4 MEIGE SYNDROME (BLEPHAROSPASM WITH OROMANDIBULAR DYSTONIA): Primary | ICD-10-CM

## 2024-03-19 DIAGNOSIS — G24.4 MEIGE SYNDROME (BLEPHAROSPASM WITH OROMANDIBULAR DYSTONIA): ICD-10-CM

## 2024-03-19 PROCEDURE — 99397 PER PM REEVAL EST PAT 65+ YR: CPT | Performed by: FAMILY MEDICINE

## 2024-03-19 RX ORDER — IBUPROFEN 200 MG
2 CAPSULE ORAL DAILY
COMMUNITY

## 2024-03-19 SDOH — ECONOMIC STABILITY: FOOD INSECURITY: WITHIN THE PAST 12 MONTHS, YOU WORRIED THAT YOUR FOOD WOULD RUN OUT BEFORE YOU GOT MONEY TO BUY MORE.: NEVER TRUE

## 2024-03-19 SDOH — ECONOMIC STABILITY: HOUSING INSECURITY
IN THE LAST 12 MONTHS, WAS THERE A TIME WHEN YOU DID NOT HAVE A STEADY PLACE TO SLEEP OR SLEPT IN A SHELTER (INCLUDING NOW)?: NO

## 2024-03-19 SDOH — ECONOMIC STABILITY: INCOME INSECURITY: HOW HARD IS IT FOR YOU TO PAY FOR THE VERY BASICS LIKE FOOD, HOUSING, MEDICAL CARE, AND HEATING?: NOT HARD AT ALL

## 2024-03-19 SDOH — ECONOMIC STABILITY: FOOD INSECURITY: WITHIN THE PAST 12 MONTHS, THE FOOD YOU BOUGHT JUST DIDN'T LAST AND YOU DIDN'T HAVE MONEY TO GET MORE.: NEVER TRUE

## 2024-03-19 ASSESSMENT — PATIENT HEALTH QUESTIONNAIRE - PHQ9
SUM OF ALL RESPONSES TO PHQ QUESTIONS 1-9: 0
1. LITTLE INTEREST OR PLEASURE IN DOING THINGS: NOT AT ALL
SUM OF ALL RESPONSES TO PHQ QUESTIONS 1-9: 0
SUM OF ALL RESPONSES TO PHQ9 QUESTIONS 1 & 2: 0
2. FEELING DOWN, DEPRESSED OR HOPELESS: NOT AT ALL

## 2024-03-19 NOTE — PROGRESS NOTES
\"Have you been to the ER, urgent care clinic since your last visit?  Hospitalized since your last visit?\"    NO    “Have you seen or consulted any other health care providers outside of [facility] since your last visit?”    NO            
Food Insecurity (3/19/2024)    Hunger Vital Sign     Worried About Running Out of Food in the Last Year: Never true     Ran Out of Food in the Last Year: Never true   Transportation Needs: Unknown (3/19/2024)    PRAPARE - Transportation     Lack of Transportation (Medical): Not on file     Lack of Transportation (Non-Medical): No   Physical Activity: Sufficiently Active (3/16/2024)    Exercise Vital Sign     Days of Exercise per Week: 4 days     Minutes of Exercise per Session: 60 min   Stress: Not on file   Social Connections: Not on file   Intimate Partner Violence: Not on file   Housing Stability: Unknown (3/19/2024)    Housing Stability Vital Sign     Unable to Pay for Housing in the Last Year: Not on file     Number of Places Lived in the Last Year: Not on file     Unstable Housing in the Last Year: No         Review of Systems  General : Denies fever, chills, unintentional weight loss.  Cardiac : Denies chest pain, shortness of breath, orthopnea, PND.  Pulm : Denies coughing, hemoptysis, dyspnea.   GI: Denies abd pain, vomiting, change in bowel movements, black/bloody stool.      Reviewed PmHx, RxHx, FmHx, SocHx, AllgHx and updated and dated in the chart.    Physical Exam:  /65 (Site: Left Upper Arm, Position: Sitting, Cuff Size: Child)   Pulse 82   Temp 96.8 °F (36 °C) (Temporal)   Resp 12   Ht 1.6 m (5' 3\")   Wt 49.9 kg (110 lb)   SpO2 98%   BMI 19.49 kg/m²   Physical Exam  Vitals reviewed.   Constitutional:       Appearance: Normal appearance.   HENT:      Head: Normocephalic and atraumatic.   Cardiovascular:      Rate and Rhythm: Normal rate and regular rhythm.      Heart sounds: Normal heart sounds.   Pulmonary:      Effort: Pulmonary effort is normal.      Breath sounds: Normal breath sounds.   Abdominal:      General: There is no distension.      Palpations: Abdomen is soft. There is no mass.      Tenderness: There is no abdominal tenderness.   Musculoskeletal:      Right lower leg: No

## 2024-05-10 ENCOUNTER — TELEPHONE (OUTPATIENT)
Dept: PRIMARY CARE CLINIC | Facility: CLINIC | Age: 69
End: 2024-05-10

## 2024-05-10 LAB
25(OH)D3 SERPL-MCNC: 60.7 NG/ML (ref 30–100)
ALBUMIN SERPL-MCNC: 3.4 G/DL (ref 3.5–5)
ALBUMIN/GLOB SERPL: 1 (ref 1.1–2.2)
ALP SERPL-CCNC: 56 U/L (ref 45–117)
ALT SERPL-CCNC: 20 U/L (ref 12–78)
ANION GAP SERPL CALC-SCNC: 1 MMOL/L (ref 5–15)
AST SERPL-CCNC: 17 U/L (ref 15–37)
BILIRUB SERPL-MCNC: 0.6 MG/DL (ref 0.2–1)
BUN SERPL-MCNC: 20 MG/DL (ref 6–20)
BUN/CREAT SERPL: 25 (ref 12–20)
CALCIUM SERPL-MCNC: 9.4 MG/DL (ref 8.5–10.1)
CHLORIDE SERPL-SCNC: 108 MMOL/L (ref 97–108)
CHOLEST SERPL-MCNC: 186 MG/DL
CO2 SERPL-SCNC: 31 MMOL/L (ref 21–32)
CREAT SERPL-MCNC: 0.79 MG/DL (ref 0.55–1.02)
ERYTHROCYTE [DISTWIDTH] IN BLOOD BY AUTOMATED COUNT: 12.8 % (ref 11.5–14.5)
EST. AVERAGE GLUCOSE BLD GHB EST-MCNC: 103 MG/DL
GLOBULIN SER CALC-MCNC: 3.5 G/DL (ref 2–4)
GLUCOSE SERPL-MCNC: 93 MG/DL (ref 65–100)
HBA1C MFR BLD: 5.2 % (ref 4–5.6)
HCT VFR BLD AUTO: 41.7 % (ref 35–47)
HDLC SERPL-MCNC: 81 MG/DL
HDLC SERPL: 2.3 (ref 0–5)
HGB BLD-MCNC: 13.6 G/DL (ref 11.5–16)
LDLC SERPL CALC-MCNC: 95.4 MG/DL (ref 0–100)
MCH RBC QN AUTO: 32.2 PG (ref 26–34)
MCHC RBC AUTO-ENTMCNC: 32.6 G/DL (ref 30–36.5)
MCV RBC AUTO: 98.8 FL (ref 80–99)
NRBC # BLD: 0 K/UL (ref 0–0.01)
NRBC BLD-RTO: 0 PER 100 WBC
PLATELET # BLD AUTO: 237 K/UL (ref 150–400)
PMV BLD AUTO: 9.4 FL (ref 8.9–12.9)
POTASSIUM SERPL-SCNC: 4.2 MMOL/L (ref 3.5–5.1)
PROT SERPL-MCNC: 6.9 G/DL (ref 6.4–8.2)
RBC # BLD AUTO: 4.22 M/UL (ref 3.8–5.2)
SODIUM SERPL-SCNC: 140 MMOL/L (ref 136–145)
TRIGL SERPL-MCNC: 48 MG/DL
TSH SERPL DL<=0.05 MIU/L-ACNC: 3.15 UIU/ML (ref 0.36–3.74)
VLDLC SERPL CALC-MCNC: 9.6 MG/DL
WBC # BLD AUTO: 4.1 K/UL (ref 3.6–11)

## 2024-05-10 RX ORDER — EPINEPHRINE 0.3 MG/.3ML
0.3 INJECTION SUBCUTANEOUS ONCE
Qty: 0.6 ML | Refills: 0 | Status: SHIPPED | OUTPATIENT
Start: 2024-05-10 | End: 2024-05-10

## 2024-05-10 NOTE — TELEPHONE ENCOUNTER
----- Message from Kristel Doe sent at 5/10/2024  9:33 AM EDT -----  Regarding: Covid shot reaction?  Contact: [phone]  Dear Dr. Drake:    On Tues 5/7 I got a Covid vaccine, Moderna Ry Vax.  I last got one 10/14/23.  It is my 8th vaccine, 6th Moderna one.  I have followed [facility] recs about how often and when to get them, this second Spikevax one because I am over 65.    I have never had a severe reaction, and am not having one now.    This time my arm was sore and swollen at injection site (expected) but yesterday (or the day before) I noticed that the  swelling was hard and warm a pink.  Occasionally it was itchy.  The pink part is a circular area about 2-3 inches in diameter.    Also, yesterday I started to have itchy and slightly swollen eyelids.  My lips also feel slightly swollen, my throat is slightly scratchy, I have an occasional cough.   All of these symptoms, except the swollen lips (if they are swollen) are consonant with symptoms I sometimes have from seasonal pollen allergies.        Also, my tongue feels a little swollen and tingly.  The skin on my face and scalp and neck/shoulders feels a little irritated and tingly.  These symptoms are consonant with symptoms I sometimes have from dystonia—it’s like a feeling of mild vasomotor constriction.    But yesterday and today I happen to have these things all at once.  I know this is hard to sort out, but is it possible I an having an allergic reaction to the vaccine?  If so, am I going to be able to get further covid vaccines?    I did not have any of this when I got the first Spikevax on 10/14/23 (except a little swelling and  soreness).

## 2024-05-10 NOTE — TELEPHONE ENCOUNTER
----- Message from Kristel Doe sent at 5/10/2024 10:56 AM EDT -----  Regarding: Covid shot reaction?  Contact: [phone]  Thanks.    Also, I forgot.  My previous PCP prescribed an epipen for me in 2022 for insect stings, to which I am allergic.  I have never had more than a localized reaction to a sting, but in summer 2021, I had a bigger local reaction than I have ever previously had.  I have never used the epipen, but it is now .  If you feel I need one, please prescribe and use [facility] [address] [id number].      Thanks,    Kristel Doe

## 2024-05-17 ENCOUNTER — HOSPITAL ENCOUNTER (OUTPATIENT)
Facility: HOSPITAL | Age: 69
Discharge: HOME | End: 2024-05-17
Attending: FAMILY MEDICINE
Payer: COMMERCIAL

## 2024-05-17 DIAGNOSIS — M81.0 AGE-RELATED OSTEOPOROSIS WITHOUT CURRENT PATHOLOGICAL FRACTURE: ICD-10-CM

## 2024-05-17 PROCEDURE — 77080 DXA BONE DENSITY AXIAL: CPT

## 2024-05-29 ENCOUNTER — HOSPITAL ENCOUNTER (EMERGENCY)
Facility: HOSPITAL | Age: 69
Discharge: HOME | End: 2024-05-29
Attending: EMERGENCY MEDICINE
Payer: COMMERCIAL

## 2024-05-29 VITALS
HEART RATE: 94 BPM | TEMPERATURE: 98.5 F | DIASTOLIC BLOOD PRESSURE: 57 MMHG | BODY MASS INDEX: 19.8 KG/M2 | SYSTOLIC BLOOD PRESSURE: 155 MMHG | RESPIRATION RATE: 16 BRPM | OXYGEN SATURATION: 98 % | WEIGHT: 111.77 LBS

## 2024-05-29 DIAGNOSIS — Z20.9 EXPOSURE TO BAT WITHOUT KNOWN BITE: Primary | ICD-10-CM

## 2024-05-29 PROCEDURE — 90471 IMMUNIZATION ADMIN: CPT | Performed by: EMERGENCY MEDICINE

## 2024-05-29 PROCEDURE — 99284 EMERGENCY DEPT VISIT MOD MDM: CPT

## 2024-05-29 PROCEDURE — 90472 IMMUNIZATION ADMIN EACH ADD: CPT | Performed by: EMERGENCY MEDICINE

## 2024-05-29 PROCEDURE — 90714 TD VACC NO PRESV 7 YRS+ IM: CPT | Performed by: EMERGENCY MEDICINE

## 2024-05-29 PROCEDURE — 90375 RABIES IG IM/SC: CPT | Performed by: EMERGENCY MEDICINE

## 2024-05-29 PROCEDURE — 90675 RABIES VACCINE IM: CPT | Performed by: EMERGENCY MEDICINE

## 2024-05-29 PROCEDURE — 96372 THER/PROPH/DIAG INJ SC/IM: CPT

## 2024-05-29 PROCEDURE — 6360000002 HC RX W HCPCS: Performed by: EMERGENCY MEDICINE

## 2024-05-29 RX ADMIN — RABIES IMMUNE GLOBULIN (HUMAN) 1020 UNITS: 300 INJECTION, SOLUTION INFILTRATION; INTRAMUSCULAR at 16:30

## 2024-05-29 RX ADMIN — RABIES VACCINE 1 ML: KIT at 16:29

## 2024-05-29 RX ADMIN — CLOSTRIDIUM TETANI TOXOID ANTIGEN (FORMALDEHYDE INACTIVATED) AND CORYNEBACTERIUM DIPHTHERIAE TOXOID ANTIGEN (FORMALDEHYDE INACTIVATED) 0.5 ML: 5; 2 INJECTION, SUSPENSION INTRAMUSCULAR at 16:27

## 2024-05-29 ASSESSMENT — ENCOUNTER SYMPTOMS
EYES NEGATIVE: 1
RESPIRATORY NEGATIVE: 1
GASTROINTESTINAL NEGATIVE: 1

## 2024-05-29 ASSESSMENT — PAIN - FUNCTIONAL ASSESSMENT: PAIN_FUNCTIONAL_ASSESSMENT: NONE - DENIES PAIN

## 2024-05-29 NOTE — ED TRIAGE NOTES
Patient presents to ER ambulatory and unaccompanied with complaints of potential exposure to bat. Patient was staying in an old house in [place] and family noted a small flying animal in the basement believed to be a bat that flew into a hole in the basement. Hole was covered with tape. Patient slept in the house on the first floor. Denies contact with bat while awake but concerned because she slept in the house.

## 2024-05-29 NOTE — ED PROVIDER NOTES
[facility]  EMERGENCY DEPARTMENT ENCOUNTER      Pt Name: Kristel Doe  MRN: [id number]  Birthdate 1955  Date of evaluation: 5/29/2024  Provider: Cliff Nazario MD    CHIEF COMPLAINT       Chief Complaint   Patient presents with    exposure to bat         HISTORY OF PRESENT ILLNESS   (Location/Symptom, Timing/Onset, Context/Setting, Quality, Duration, Modifying Factors, Severity)  Note limiting factors.   68-year-old female with PMHx of anxiety, depression, presents to ER ambulatory and unaccompanied with complaints of potential exposure to bat. Patient was staying in an old house in [place] and family noted a small flying animal in the basement believed to be a bat that flew into a hole in the basement. Hole was covered with tape. Patient slept in the house on the first floor. Denies contact with bat while awake but concerned because she slept in the house.  She has no additional complaints at this time    The history is provided by the patient.         Review of External Medical Records:     Nursing Notes were reviewed.    REVIEW OF SYSTEMS    (2-9 systems for level 4, 10 or more for level 5)     Review of Systems   Constitutional: Negative.    HENT: Negative.     Eyes: Negative.    Respiratory: Negative.     Cardiovascular: Negative.    Gastrointestinal: Negative.    Genitourinary: Negative.    Musculoskeletal: Negative.    Skin: Negative.    Neurological: Negative.    Psychiatric/Behavioral: Negative.         Except as noted above the remainder of the review of systems was reviewed and negative.       PAST MEDICAL HISTORY     Past Medical History:   Diagnosis Date    Aneurysm of heart NEC     atrial septum    Anxiety     Cataract     left - removed    Chronic pain     due to dystonia    Chronic pain     tendonitis or bursitis of buttock - left side    Depression     Diabetes in pregnancy 1997    Dystonia     cranial    GERD (gastroesophageal reflux disease)     Headache 1977?

## 2024-05-29 NOTE — ED NOTES
Patient stable at time of discharge. Reviewed discharge instructions, follow up for rabies. Patient provided with physical copy of rabies prescription to take to follow up appointments. Prescription scanned into chart. Allowed time for questions. Patient verbalized understanding. Ambulatory out of department with steady gait unaccompanied.

## 2024-05-29 NOTE — CARE COORDINATION
CM met with pt to discuss options for PCP. Pt is currently uninsured. 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 Pt income also too high for [facility]. Pt plans to fill out application for financial assistance through [employer]. Pt has this form from admissions at [facility].     Pt's friend/family member arrived to transport home.    FAHAD Romero   or by [employer]

## 2024-05-29 NOTE — ED NOTES
Spoke with Camila with Case Management at this time regarding patient need for follow up rabies vaccines

## 2024-05-29 NOTE — DISCHARGE INSTRUCTIONS
You were seen in the emergency department for a bat exposure. Please take any medications prescribed at this visit as instructed.  Please follow-up to complete your vaccine series and to follow-up with your PCP or return to the emergency department if you experience a worsening of symptoms or any new symptoms that are concerning to you.

## 2024-05-31 PROBLEM — Z20.3 RABIES EXPOSURE: Status: ACTIVE | Noted: 2024-05-31

## 2024-06-01 ENCOUNTER — HOSPITAL ENCOUNTER (OUTPATIENT)
Facility: HOSPITAL | Age: 69
Setting detail: INFUSION SERIES
Discharge: HOME | End: 2024-06-01
Payer: COMMERCIAL

## 2024-06-01 VITALS
DIASTOLIC BLOOD PRESSURE: 52 MMHG | HEART RATE: 91 BPM | SYSTOLIC BLOOD PRESSURE: 151 MMHG | RESPIRATION RATE: 17 BRPM | OXYGEN SATURATION: 96 %

## 2024-06-01 DIAGNOSIS — Z20.3 RABIES EXPOSURE: Primary | ICD-10-CM

## 2024-06-01 PROCEDURE — 90471 IMMUNIZATION ADMIN: CPT | Performed by: NURSE PRACTITIONER

## 2024-06-01 PROCEDURE — 90675 RABIES VACCINE IM: CPT | Performed by: NURSE PRACTITIONER

## 2024-06-01 PROCEDURE — 6360000002 HC RX W HCPCS: Performed by: NURSE PRACTITIONER

## 2024-06-01 RX ADMIN — RABIES VACCINE 1 ML: KIT at 10:44

## 2024-06-01 ASSESSMENT — PAIN SCALES - GENERAL: PAINLEVEL_OUTOF10: 3

## 2024-06-01 ASSESSMENT — PAIN DESCRIPTION - DESCRIPTORS: DESCRIPTORS: ACHING

## 2024-06-01 ASSESSMENT — PAIN DESCRIPTION - ORIENTATION: ORIENTATION: RIGHT;LEFT

## 2024-06-01 ASSESSMENT — PAIN DESCRIPTION - LOCATION: LOCATION: FOOT

## 2024-06-01 NOTE — PROGRESS NOTES
[facility] Progress Note    Date: June 1, 2024        Pt arrived ambulatory to [facility] for Rabies Vaccine in stable condition.  Assessment completed, no new concerns voiced.    Patient Vitals for the past 12 hrs:   Pulse Resp BP SpO2   06/01/24 1030 91 17 (!) 151/52 96 %       Medications Administered         rabies vaccine, [facility] (RABAVERT) injection 1 mL Admin Date  06/01/2024 Action  Given Dose  1 mL Route  IntraMUSCular Administered By  Alana Pearl RN                   Tolerated treatment well, no adverse reactions noted. D/Cd from [facility] ambulatory and in no distress.  Patient is aware of next scheduled [facility] appointment on 6/5/24.    Future Appointments   Date Time Provider Department Center   6/1/2024 11:00 AM [facility] FASTTRACK 1 [facility]   6/5/2024  4:00 PM PEDS FASTTRACK 2 [facility]   6/12/2024  3:00 PM PEDS FASTTRACK 1 [facility]   6/17/2024 11:30 AM Starla Drake DO [facility] AMB           Alana Pearl RN  June 1, 2024

## 2024-06-05 ENCOUNTER — HOSPITAL ENCOUNTER (OUTPATIENT)
Facility: HOSPITAL | Age: 69
Setting detail: INFUSION SERIES
Discharge: HOME | End: 2024-06-05
Payer: COMMERCIAL

## 2024-06-05 VITALS
DIASTOLIC BLOOD PRESSURE: 69 MMHG | HEART RATE: 91 BPM | TEMPERATURE: 98.6 F | OXYGEN SATURATION: 97 % | SYSTOLIC BLOOD PRESSURE: 141 MMHG | RESPIRATION RATE: 18 BRPM

## 2024-06-05 DIAGNOSIS — Z20.3 RABIES EXPOSURE: Primary | ICD-10-CM

## 2024-06-05 PROCEDURE — 90675 RABIES VACCINE IM: CPT | Performed by: NURSE PRACTITIONER

## 2024-06-05 PROCEDURE — 6360000002 HC RX W HCPCS: Performed by: NURSE PRACTITIONER

## 2024-06-05 PROCEDURE — 90471 IMMUNIZATION ADMIN: CPT | Performed by: NURSE PRACTITIONER

## 2024-06-05 RX ADMIN — RABIES VACCINE 1 ML: KIT at 16:10

## 2024-06-05 ASSESSMENT — PAIN DESCRIPTION - ORIENTATION: ORIENTATION: RIGHT;LEFT

## 2024-06-05 ASSESSMENT — PAIN DESCRIPTION - DESCRIPTORS: DESCRIPTORS: ACHING

## 2024-06-05 ASSESSMENT — PAIN DESCRIPTION - LOCATION: LOCATION: FOOT

## 2024-06-05 ASSESSMENT — PAIN SCALES - GENERAL: PAINLEVEL_OUTOF10: 2

## 2024-06-05 ASSESSMENT — PAIN DESCRIPTION - PAIN TYPE: TYPE: CHRONIC PAIN

## 2024-06-05 NOTE — PROGRESS NOTES
[facility] Peds/Adult Note                   Date: 2024    Name: Kristel Doe    MRN: [id number]         : 1955    1600 - Patient arrives for Rabies Vaccine Day 7 without acute problems. Please see Epic for complete assessment and education provided.    Vital signs stable throughout and prior to discharge. Patient tolerated procedure well and was discharged without incident. Patient is aware of next [facility] appointment on 2024. Appointment card give to the Patient.      Ms. Doe's vitals were reviewed prior to and after treatment.   Patient Vitals for the past 12 hrs:   Temp Pulse Resp BP SpO2   24 1600 98.6 °F (37 °C) 91 18 (!) 141/69 97 %       Lab results were reviewed.  No results found for this or any previous visit (from the past 12 hour(s)).    Medications given:   Medications Administered         rabies vaccine, PCEC (RABAVERT) injection 1 mL Admin Date  2024 Action  Given Dose  1 mL Route  IntraMUSCular Administered By  Hyun Fletcher, JENNIFER            Ms. Doe tolerated the infusion, and had no complaints.    [facility]. Discharge Instructions provided to patient, patient verbalized understanding and given a copy of after visit summary.     Future Appointments   Date Time Provider Department Center   2024  3:00 PM PEDS FASTTRACK 1 [facility]   2024 11:30 AM Strala Drake DO [facility]       Hyun Fletcher RN  2024  4:16 PM

## 2024-06-12 ENCOUNTER — APPOINTMENT (OUTPATIENT)
Facility: HOSPITAL | Age: 69
End: 2024-06-12
Payer: COMMERCIAL

## 2024-06-12 ENCOUNTER — HOSPITAL ENCOUNTER (OUTPATIENT)
Facility: HOSPITAL | Age: 69
Setting detail: INFUSION SERIES
Discharge: HOME | End: 2024-06-12
Payer: COMMERCIAL

## 2024-06-12 VITALS
SYSTOLIC BLOOD PRESSURE: 124 MMHG | TEMPERATURE: 97.8 F | HEART RATE: 85 BPM | OXYGEN SATURATION: 98 % | DIASTOLIC BLOOD PRESSURE: 59 MMHG

## 2024-06-12 DIAGNOSIS — Z20.3 RABIES EXPOSURE: Primary | ICD-10-CM

## 2024-06-12 PROCEDURE — 6360000002 HC RX W HCPCS: Performed by: NURSE PRACTITIONER

## 2024-06-12 PROCEDURE — 90471 IMMUNIZATION ADMIN: CPT | Performed by: NURSE PRACTITIONER

## 2024-06-12 PROCEDURE — 90675 RABIES VACCINE IM: CPT | Performed by: NURSE PRACTITIONER

## 2024-06-12 RX ADMIN — RABIES VACCINE 1 ML: KIT at 15:21

## 2024-06-12 ASSESSMENT — PAIN DESCRIPTION - ORIENTATION: ORIENTATION: RIGHT;LEFT

## 2024-06-12 ASSESSMENT — PAIN DESCRIPTION - LOCATION: LOCATION: FOOT

## 2024-06-12 ASSESSMENT — PAIN DESCRIPTION - DESCRIPTORS: DESCRIPTORS: STABBING;ACHING

## 2024-06-12 ASSESSMENT — PAIN SCALES - GENERAL: PAINLEVEL_OUTOF10: 3

## 2024-06-12 ASSESSMENT — PAIN DESCRIPTION - PAIN TYPE: TYPE: CHRONIC PAIN

## 2024-06-12 NOTE — PROGRESS NOTES
PEDI [facility] VISIT NOTE - Rabies Vaccine Series     1510 Patient arrives for Rabies Vaccine Day 14 without acute problems. Please see connect care for complete assessment and education provided.      Medications Verified by Mary Renae RN :  1. Rabavert 2.5 units IM via Left Deltoid     Vital signs stable throughout and prior to discharge. Patient discharged without incident. Patient is aware of no further [facility] appointments @ this time & to follow up with healthcare provider for next appointment.      VITAL SIGNS  Patient Vitals for the past 12 hrs:   Temp Pulse BP SpO2   06/12/24 1510 97.8 °F (36.6 °C) 85 (!) 124/59 98 %

## 2024-06-17 ENCOUNTER — OFFICE VISIT (OUTPATIENT)
Dept: PRIMARY CARE CLINIC | Facility: CLINIC | Age: 69
End: 2024-06-17
Payer: COMMERCIAL

## 2024-06-17 ENCOUNTER — TELEPHONE (OUTPATIENT)
Dept: PRIMARY CARE CLINIC | Facility: CLINIC | Age: 69
End: 2024-06-17

## 2024-06-17 VITALS
OXYGEN SATURATION: 97 % | WEIGHT: 111.2 LBS | HEIGHT: 63 IN | HEART RATE: 80 BPM | BODY MASS INDEX: 19.7 KG/M2 | TEMPERATURE: 97.1 F | SYSTOLIC BLOOD PRESSURE: 120 MMHG | DIASTOLIC BLOOD PRESSURE: 69 MMHG | RESPIRATION RATE: 12 BRPM

## 2024-06-17 DIAGNOSIS — E78.2 MIXED HYPERLIPIDEMIA: ICD-10-CM

## 2024-06-17 DIAGNOSIS — G24.4 MEIGE SYNDROME (BLEPHAROSPASM WITH OROMANDIBULAR DYSTONIA): Primary | ICD-10-CM

## 2024-06-17 DIAGNOSIS — M81.0 AGE-RELATED OSTEOPOROSIS WITHOUT CURRENT PATHOLOGICAL FRACTURE: ICD-10-CM

## 2024-06-17 PROCEDURE — 99212 OFFICE O/P EST SF 10 MIN: CPT | Performed by: FAMILY MEDICINE

## 2024-06-17 PROCEDURE — 1123F ACP DISCUSS/DSCN MKR DOCD: CPT | Performed by: FAMILY MEDICINE

## 2024-06-17 SDOH — ECONOMIC STABILITY: FOOD INSECURITY: WITHIN THE PAST 12 MONTHS, YOU WORRIED THAT YOUR FOOD WOULD RUN OUT BEFORE YOU GOT MONEY TO BUY MORE.: NEVER TRUE

## 2024-06-17 SDOH — ECONOMIC STABILITY: FOOD INSECURITY: WITHIN THE PAST 12 MONTHS, THE FOOD YOU BOUGHT JUST DIDN'T LAST AND YOU DIDN'T HAVE MONEY TO GET MORE.: NEVER TRUE

## 2024-06-17 SDOH — ECONOMIC STABILITY: INCOME INSECURITY: HOW HARD IS IT FOR YOU TO PAY FOR THE VERY BASICS LIKE FOOD, HOUSING, MEDICAL CARE, AND HEATING?: NOT HARD AT ALL

## 2024-06-17 ASSESSMENT — PATIENT HEALTH QUESTIONNAIRE - PHQ9
2. FEELING DOWN, DEPRESSED OR HOPELESS: SEVERAL DAYS
SUM OF ALL RESPONSES TO PHQ9 QUESTIONS 1 & 2: 1
SUM OF ALL RESPONSES TO PHQ QUESTIONS 1-9: 1
1. LITTLE INTEREST OR PLEASURE IN DOING THINGS: NOT AT ALL

## 2024-06-17 NOTE — PROGRESS NOTES
\"Have you been to the ER, urgent care clinic since your last visit?  Hospitalized since your last visit?\"    NO    “Have you seen or consulted any other health care providers outside of [facility] since your last visit?”    NO            Click Here for Release of Records Request

## 2024-06-17 NOTE — PROGRESS NOTES
Subjective  Kristel Doe is an 68 y.o. female who presents for follow up.      Primary Idiopathic Cranial Dystonia dx in 2003. Followed by neurology at [facility]. On botox injections at [facility].      HLD. Diet controlled.      OP. She had GI adverse effects with bisphosphonates. Tried reclast and discontinued this due to myalgias. She is not interested in prolia at this time, due to concern about side effects.      DXA 5/17/24 :   This patient is osteoporotic using the World Health Organization criteria  10 year probability of major osteoporotic fracture: 9.6%.  10 year probability of hip fracture: 1.7%.        GERD. Hx barium swallow, manometry, egd performed. Her symptoms have been more attributed to cervical dystonia. Follows with GI.      Plantar fibromatosis. Consulted with podiatry and [facility]. She has improved with PT and conservative measures. Still having bothersome foot pain that does limit her physical activities.      Hx Atrial septal aneurysm. No intervention indicated.         Allergies - reviewed:   Allergies   Allergen Reactions    Diclofenac Sodium Hives, Nausea And Vomiting and Other (See Comments)     Patient states she does not get hives.    Naproxen Nausea And Vomiting    Promethazine Other (See Comments)     Cannot taking d/t having cranial dystonia.    Bee Venom Swelling    Charentais Melon (Tamazight Melon) Cough     Itchy lips/scratchy throat.    Cherry Cough     Scratchy throat/itchy lips.    Fluticasone Swelling     Swelling of lips and tongue.    Fluticasone-Salmeterol Other (See Comments)     Agitation/jittery    Prochlorperazine Other (See Comments)     Contraindicated d/t having dystonia.    Sulfa Antibiotics Other (See Comments)     GI Distress    Aspirin Nausea And Vomiting    Codeine Nausea And Vomiting    Covid-19 Mrna Vacc (Moderna) Rash    Ibuprofen Nausea And Vomiting    Nsaids Nausea And Vomiting         Medications - reviewed:   Current Outpatient Medications   Medication Sig

## 2025-05-30 ENCOUNTER — OFFICE VISIT (OUTPATIENT)
Dept: PRIMARY CARE CLINIC | Facility: CLINIC | Age: 70
End: 2025-05-30

## 2025-05-30 VITALS
HEART RATE: 81 BPM | OXYGEN SATURATION: 99 % | RESPIRATION RATE: 18 BRPM | DIASTOLIC BLOOD PRESSURE: 63 MMHG | SYSTOLIC BLOOD PRESSURE: 99 MMHG | BODY MASS INDEX: 19.28 KG/M2 | TEMPERATURE: 97.8 F | WEIGHT: 108.8 LBS | HEIGHT: 63 IN

## 2025-05-30 DIAGNOSIS — Z88.7 ALLERGY TO VACCINE: Primary | ICD-10-CM

## 2025-05-30 DIAGNOSIS — M72.2 PLANTAR FIBROMATOSIS: ICD-10-CM

## 2025-05-30 DIAGNOSIS — G24.4 MEIGE SYNDROME (BLEPHAROSPASM WITH OROMANDIBULAR DYSTONIA): ICD-10-CM

## 2025-05-30 DIAGNOSIS — E78.2 MIXED HYPERLIPIDEMIA: ICD-10-CM

## 2025-05-30 DIAGNOSIS — M81.0 AGE-RELATED OSTEOPOROSIS WITHOUT CURRENT PATHOLOGICAL FRACTURE: ICD-10-CM

## 2025-05-30 DIAGNOSIS — Z91.030 ALLERGY TO YELLOW JACKETS: ICD-10-CM

## 2025-05-30 RX ORDER — EPINEPHRINE 0.3 MG/.3ML
0.3 INJECTION SUBCUTANEOUS ONCE
Qty: 0.3 ML | Refills: 0 | Status: SHIPPED | OUTPATIENT
Start: 2025-05-30 | End: 2025-05-30

## 2025-05-30 SDOH — ECONOMIC STABILITY: FOOD INSECURITY: WITHIN THE PAST 12 MONTHS, YOU WORRIED THAT YOUR FOOD WOULD RUN OUT BEFORE YOU GOT MONEY TO BUY MORE.: NEVER TRUE

## 2025-05-30 SDOH — ECONOMIC STABILITY: FOOD INSECURITY: WITHIN THE PAST 12 MONTHS, THE FOOD YOU BOUGHT JUST DIDN'T LAST AND YOU DIDN'T HAVE MONEY TO GET MORE.: NEVER TRUE

## 2025-05-30 ASSESSMENT — PATIENT HEALTH QUESTIONNAIRE - PHQ9
4. FEELING TIRED OR HAVING LITTLE ENERGY: NOT AT ALL
SUM OF ALL RESPONSES TO PHQ QUESTIONS 1-9: 0
1. LITTLE INTEREST OR PLEASURE IN DOING THINGS: NOT AT ALL
SUM OF ALL RESPONSES TO PHQ QUESTIONS 1-9: 0
6. FEELING BAD ABOUT YOURSELF - OR THAT YOU ARE A FAILURE OR HAVE LET YOURSELF OR YOUR FAMILY DOWN: NOT AT ALL
SUM OF ALL RESPONSES TO PHQ QUESTIONS 1-9: 0
10. IF YOU CHECKED OFF ANY PROBLEMS, HOW DIFFICULT HAVE THESE PROBLEMS MADE IT FOR YOU TO DO YOUR WORK, TAKE CARE OF THINGS AT HOME, OR GET ALONG WITH OTHER PEOPLE: NOT DIFFICULT AT ALL
SUM OF ALL RESPONSES TO PHQ QUESTIONS 1-9: 0
7. TROUBLE CONCENTRATING ON THINGS, SUCH AS READING THE NEWSPAPER OR WATCHING TELEVISION: NOT AT ALL
9. THOUGHTS THAT YOU WOULD BE BETTER OFF DEAD, OR OF HURTING YOURSELF: NOT AT ALL
3. TROUBLE FALLING OR STAYING ASLEEP: NOT AT ALL
8. MOVING OR SPEAKING SO SLOWLY THAT OTHER PEOPLE COULD HAVE NOTICED. OR THE OPPOSITE, BEING SO FIGETY OR RESTLESS THAT YOU HAVE BEEN MOVING AROUND A LOT MORE THAN USUAL: NOT AT ALL
5. POOR APPETITE OR OVEREATING: NOT AT ALL
2. FEELING DOWN, DEPRESSED OR HOPELESS: NOT AT ALL

## 2025-05-30 NOTE — PROGRESS NOTES
HPI     Chief Complaint   Patient presents with    Establish Care     NTP  WANTS PHYSICAL/ NOT ON MEDICARE RIGHT NOW     She is a 69 y.o. female who presents for establishing care.     Former Dr. Drake patient.     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 Was previously on Reclast but caused generalized body pain for 3 months and left her with residual bone pain. She had last in 2014 and only had 1 injection. Was told not to take bisphosphonate's but had recommended Prolia. She does not feel she is a good candidate for this though. Did see Endo to discuss other alternatives and they told her in 2016 she did not need osteoporosis meds but wanted to consider eventually. Had discussed with Dr. Drake when her scores returned last year but did not want to take it because she feels her teeth are not in good shape and she is concerned she may end up needing oral surgery at some point.     Had trouble swallowing - had manometry, EGD. Followed by GI. Erick to be from cervical dystonia.     She has an epi pen. A few years ago she was gardening and she had a yellow jacket nest that she got stung and she states it took a long time to go away. Was told by Allergy to get an Epi Pen. Has never used it.     On her 8th COVID vaccine she had a slight pink red reaction but felt she was having allergic symptoms. States the rash eventually went away and she wonders if the reaction was pollen reaction or had pins/ needles in her tongue and lips and states she does get this also from her dystonia. States she assumed it might be an allergic reaction. States she did get a 9th vaccine and didn't have any rash or anaphylaxis. States 12 hours after she woke up in middle

## 2025-06-23 DIAGNOSIS — T78.49XA ALLERGIC REACTION TO COVID-19 VACCINE: Primary | ICD-10-CM

## 2025-06-26 ENCOUNTER — OFFICE VISIT (OUTPATIENT)
Dept: PRIMARY CARE CLINIC | Facility: CLINIC | Age: 70
End: 2025-06-26
Payer: COMMERCIAL

## 2025-06-26 VITALS
OXYGEN SATURATION: 97 % | BODY MASS INDEX: 19 KG/M2 | SYSTOLIC BLOOD PRESSURE: 124 MMHG | WEIGHT: 107.2 LBS | HEIGHT: 63 IN | DIASTOLIC BLOOD PRESSURE: 60 MMHG | TEMPERATURE: 97.9 F | HEART RATE: 77 BPM | RESPIRATION RATE: 18 BRPM

## 2025-06-26 DIAGNOSIS — G43.109 MIGRAINE WITH AURA AND WITHOUT STATUS MIGRAINOSUS, NOT INTRACTABLE: ICD-10-CM

## 2025-06-26 DIAGNOSIS — Z00.00 WELL WOMAN EXAM (NO GYNECOLOGICAL EXAM): ICD-10-CM

## 2025-06-26 DIAGNOSIS — Z00.00 ANNUAL PHYSICAL EXAM: Primary | ICD-10-CM

## 2025-06-26 PROCEDURE — 99397 PER PM REEVAL EST PAT 65+ YR: CPT | Performed by: FAMILY MEDICINE

## 2025-06-26 ASSESSMENT — PATIENT HEALTH QUESTIONNAIRE - PHQ9
1. LITTLE INTEREST OR PLEASURE IN DOING THINGS: NOT AT ALL
SUM OF ALL RESPONSES TO PHQ QUESTIONS 1-9: 0
2. FEELING DOWN, DEPRESSED OR HOPELESS: NOT AT ALL
SUM OF ALL RESPONSES TO PHQ QUESTIONS 1-9: 0

## 2025-06-26 NOTE — PROGRESS NOTES
Health Decision Maker has been checked with the patient   Primary Decision Maker: Ranjit Aparicio - Spouse - [phone]     AI form not signed    Chief Complaint   Patient presents with    Annual Exam       \"Have you been to the ER, urgent care clinic since your last visit?  Hospitalized since your last visit?\"    NO    “Have you seen or consulted any other health care providers outside of [facility] since your last visit?”    NO      Vitals:    06/26/25 0852   Resp: 18   Temp: 97.9 °F (36.6 °C)   TempSrc: Oral   SpO2: 97%   Weight: 48.6 kg (107 lb 3.2 oz)   Height: 1.6 m (5' 3\")      Depression: Not at risk (5/30/2025)    PHQ-2     PHQ-2 Score: 0          “Have you had a colorectal cancer screening such as a colonoscopy/FIT/Cologuard?    NO  Postponed to september  Date of last Colonoscopy: 6/19/2020  No cologuard on file  No FIT/FOBT on file   No flexible sigmoidoscopy on file         Click Here for Release of Records Request    Chart reviewed: immunizations are documented.   Immunization History   Administered Date(s) Administered    COVID-19, MODERNA BLUE border, Primary or Immunocompromised, (age 12y+), IM, 100 mcg/0.5mL 11/16/2021    COVID-19, MODERNA Bivalent, (age 12y+), IM, 50 mcg/0.5 mL 05/02/2022, 10/14/2022    COVID-19, MODERNA, 2024/25, (age 12y+), IM, 50mcg/0.5mL 05/07/2023    COVID-19, PFIZER PURPLE top, DILUTE for use, (age 12 y+), 30mcg/0.3mL 04/14/2021, 05/05/2021    Influenza A (P3q5-71),all Formulations 03/30/2010    Influenza Trivalent 12/05/2008, 10/06/2014, 10/06/2014, 10/15/2015, 10/15/2015    Influenza Virus Vaccine 10/06/2014, 10/11/2016, 11/20/2018    Influenza, FLUARIX, FLULAVAL, FLUZONE (age 6 mo+) and AFLURIA, (age 3 y+), Quadv PF, 0.5mL 09/30/2015, 09/29/2017, 10/07/2019, 09/16/2020, 09/27/2021    Influenza, FLUCELVAX, (age 6 mo+), MDCK, Quadv PF, 0.5mL 11/28/2018    Influenza, Trivalent PF 09/03/2010, 09/22/2011, 09/25/2012, 09/17/2013    Pneumococcal, PPSV23,

## 2025-06-26 NOTE — PROGRESS NOTES
HPI     Chief Complaint   Patient presents with    Annual Exam     She is a 69 y.o. female who presents for annual exam.     Normal diet, avoids red meat as much as possible  Does exercise regularly, not vigorous but tries to walk 2 miles every day and online program Get Well for osteoporosis, also takes ballet once weekly  Never smoked, no alcohol use   Had PPSV23 and then had Prevnar 20  She plans to undergo allergy testing for COVID 19 vaccine  Has [facility]  Has stopped PAPs per GYN  Last DEXA May 2024  She scheduled her colonoscopy due to concern for allergy to PEG and rescheduled to discuss alternative preps     She is taking 400 units of calcium.     Used to have migraines associated with her menses. Also were associated with pollution and pollen. After menopause went away. Started having an aura with her migraines not long after. Doesn't always get the headaches with aura. Thinks it is weather related. Thinks maybe it is related to barometric pressure. In 2022 saw a neuro for this and had an MRI which they stated was okay and was told it was a migraine aura. No facial asymmetry, slurred speech or weakness on one side. States it looks like a pixelated line when they have the aura but goes away quickly. Does not want any evaluation/ treatment for it. States she does not want to do anything about it right now just wants it \"documented in the chart.\"     States she has called and checked with her insurance and CBC, CMP, TSH, lipid panel, A1c and Vitamin D are completely covered under a physical. States she wants these labs ordered.     Reviewed PmHx, RxHx, FmHx, SocHx, AllgHx and updated and dated in the chart.    Physical Exam:  /60   Pulse 77   Temp 97.9 °F (36.6 °C) (Oral)   Resp 18   Ht 1.6 m (5' 3\")   Wt 48.6 kg (107 lb 3.2 oz)   SpO2 97%   BMI 18.99 kg/m²   Physical Exam  Vitals and nursing note reviewed.   Constitutional:       General: She is not in acute

## 2025-06-27 ENCOUNTER — RESULTS FOLLOW-UP (OUTPATIENT)
Dept: PRIMARY CARE CLINIC | Facility: CLINIC | Age: 70
End: 2025-06-27

## 2025-06-27 DIAGNOSIS — R17 ELEVATED BILIRUBIN: Primary | ICD-10-CM

## 2025-06-27 LAB
25(OH)D3 SERPL-MCNC: 56 NG/ML (ref 30–100)
ALBUMIN SERPL-MCNC: 3.8 G/DL (ref 3.5–5)
ALBUMIN/GLOB SERPL: 1.3 (ref 1.1–2.2)
ALP SERPL-CCNC: 50 U/L (ref 45–117)
ALT SERPL-CCNC: 29 U/L (ref 12–78)
ANION GAP SERPL CALC-SCNC: 2 MMOL/L (ref 2–12)
AST SERPL-CCNC: 20 U/L (ref 15–37)
BILIRUB SERPL-MCNC: 1.1 MG/DL (ref 0.2–1)
BUN SERPL-MCNC: 21 MG/DL (ref 6–20)
BUN/CREAT SERPL: 26 (ref 12–20)
CALCIUM SERPL-MCNC: 9.3 MG/DL (ref 8.5–10.1)
CHLORIDE SERPL-SCNC: 107 MMOL/L (ref 97–108)
CHOLEST SERPL-MCNC: 196 MG/DL
CO2 SERPL-SCNC: 31 MMOL/L (ref 21–32)
CREAT SERPL-MCNC: 0.8 MG/DL (ref 0.55–1.02)
ERYTHROCYTE [DISTWIDTH] IN BLOOD BY AUTOMATED COUNT: 12.6 % (ref 11.5–14.5)
EST. AVERAGE GLUCOSE BLD GHB EST-MCNC: 97 MG/DL
GLOBULIN SER CALC-MCNC: 3 G/DL (ref 2–4)
GLUCOSE SERPL-MCNC: 92 MG/DL (ref 65–100)
HBA1C MFR BLD: 5 % (ref 4–5.6)
HCT VFR BLD AUTO: 47.6 % (ref 35–47)
HDLC SERPL-MCNC: 58 MG/DL
HDLC SERPL: 3.4 (ref 0–5)
HGB BLD-MCNC: 14.7 G/DL (ref 11.5–16)
LDLC SERPL CALC-MCNC: 126 MG/DL (ref 0–100)
MCH RBC QN AUTO: 32.7 PG (ref 26–34)
MCHC RBC AUTO-ENTMCNC: 30.9 G/DL (ref 30–36.5)
MCV RBC AUTO: 105.8 FL (ref 80–99)
NRBC # BLD: 0 K/UL (ref 0–0.01)
NRBC BLD-RTO: 0 PER 100 WBC
PLATELET # BLD AUTO: 300 K/UL (ref 150–400)
PMV BLD AUTO: 9.6 FL (ref 8.9–12.9)
POTASSIUM SERPL-SCNC: 4.4 MMOL/L (ref 3.5–5.1)
PROT SERPL-MCNC: 6.8 G/DL (ref 6.4–8.2)
RBC # BLD AUTO: 4.5 M/UL (ref 3.8–5.2)
SODIUM SERPL-SCNC: 140 MMOL/L (ref 136–145)
TRIGL SERPL-MCNC: 60 MG/DL
TSH SERPL DL<=0.05 MIU/L-ACNC: 2.05 UIU/ML (ref 0.36–3.74)
VIT B12 SERPL-MCNC: 541 PG/ML (ref 193–986)
VLDLC SERPL CALC-MCNC: 12 MG/DL
WBC # BLD AUTO: 4 K/UL (ref 3.6–11)

## 2025-07-09 DIAGNOSIS — R17 ELEVATED BILIRUBIN: ICD-10-CM

## 2025-07-10 LAB
BILIRUB DIRECT SERPL-MCNC: 0.2 MG/DL (ref 0–0.2)
BILIRUB INDIRECT SERPL-MCNC: 0.5 MG/DL (ref 0–1.1)
BILIRUB SERPL-MCNC: 0.7 MG/DL (ref 0.2–1)

## (undated) DEVICE — SYR 10ML LUER LOK 1/5ML GRAD --

## (undated) DEVICE — TUBING HYDR IRR --

## (undated) DEVICE — SYRINGE 50ML E/T

## (undated) DEVICE — SYR ASSEMB INFL BLLN 60ML --

## (undated) DEVICE — ESOPHAGEAL BALLOON DILATATION CATHETER: Brand: CRE FIXED WIRE

## (undated) DEVICE — BRAVO CF CAPSULE  DELIVERY DEV, 5-PK: Brand: BRAVO

## (undated) DEVICE — Device

## (undated) DEVICE — BASIN EMSIS 16OZ GRAPHITE PLAS KID SHP MOLD GRAD FOR ORAL

## (undated) DEVICE — SOLUTION IRRIG 1000ML H2O STRL BLT